# Patient Record
Sex: MALE | Race: WHITE | Employment: UNEMPLOYED | URBAN - METROPOLITAN AREA
[De-identification: names, ages, dates, MRNs, and addresses within clinical notes are randomized per-mention and may not be internally consistent; named-entity substitution may affect disease eponyms.]

---

## 2018-08-31 ENCOUNTER — APPOINTMENT (OUTPATIENT)
Dept: CT IMAGING | Age: 33
End: 2018-08-31
Payer: MEDICAID

## 2018-08-31 ENCOUNTER — HOSPITAL ENCOUNTER (EMERGENCY)
Age: 33
Discharge: HOME | End: 2018-08-31
Attending: EMERGENCY MEDICINE
Payer: MEDICAID

## 2018-08-31 ENCOUNTER — HOSPITAL ENCOUNTER (INPATIENT)
Age: 33
LOS: 2 days | Discharge: HOME | DRG: 047 | End: 2018-09-02
Attending: INTERNAL MEDICINE | Admitting: INTERNAL MEDICINE
Payer: MEDICAID

## 2018-08-31 VITALS
TEMPERATURE: 97.8 F | DIASTOLIC BLOOD PRESSURE: 87 MMHG | HEART RATE: 80 BPM | RESPIRATION RATE: 20 BRPM | HEIGHT: 67 IN | SYSTOLIC BLOOD PRESSURE: 122 MMHG | OXYGEN SATURATION: 97 % | BODY MASS INDEX: 23.54 KG/M2 | WEIGHT: 150 LBS

## 2018-08-31 DIAGNOSIS — R20.0 LEFT ARM NUMBNESS: ICD-10-CM

## 2018-08-31 DIAGNOSIS — R29.898 LEFT ARM WEAKNESS: Primary | ICD-10-CM

## 2018-08-31 PROBLEM — R20.2 NUMBNESS AND TINGLING OF BOTH FEET: Status: ACTIVE | Noted: 2018-08-31

## 2018-08-31 PROBLEM — G45.9 TIA (TRANSIENT ISCHEMIC ATTACK): Status: ACTIVE | Noted: 2018-08-31

## 2018-08-31 PROBLEM — F19.10 POLYSUBSTANCE ABUSE (HCC): Status: ACTIVE | Noted: 2018-08-31

## 2018-08-31 PROBLEM — F17.200 SMOKER: Status: ACTIVE | Noted: 2018-08-31

## 2018-08-31 LAB
A/G RATIO: 1.7 (ref 1.1–2.2)
ALBUMIN SERPL-MCNC: 4.5 G/DL (ref 3.4–5)
ALP BLD-CCNC: 83 U/L (ref 40–129)
ALT SERPL-CCNC: 30 U/L (ref 10–40)
AMPHETAMINE SCREEN, URINE: ABNORMAL
ANION GAP SERPL CALCULATED.3IONS-SCNC: 11 MMOL/L (ref 3–16)
AST SERPL-CCNC: 54 U/L (ref 15–37)
BARBITURATE SCREEN URINE: ABNORMAL
BASOPHILS ABSOLUTE: 0.1 K/UL (ref 0–0.2)
BASOPHILS RELATIVE PERCENT: 0.3 %
BENZODIAZEPINE SCREEN, URINE: ABNORMAL
BILIRUB SERPL-MCNC: <0.2 MG/DL (ref 0–1)
BILIRUBIN URINE: NEGATIVE
BLOOD, URINE: ABNORMAL
BUN BLDV-MCNC: 21 MG/DL (ref 7–20)
CALCIUM SERPL-MCNC: 9.3 MG/DL (ref 8.3–10.6)
CANNABINOID SCREEN URINE: POSITIVE
CHLORIDE BLD-SCNC: 100 MMOL/L (ref 99–110)
CLARITY: CLEAR
CO2: 27 MMOL/L (ref 21–32)
COCAINE METABOLITE SCREEN URINE: ABNORMAL
COLOR: YELLOW
CREAT SERPL-MCNC: 0.8 MG/DL (ref 0.9–1.3)
EOSINOPHILS ABSOLUTE: 0 K/UL (ref 0–0.6)
EOSINOPHILS RELATIVE PERCENT: 0 %
EPITHELIAL CELLS, UA: ABNORMAL /HPF
GFR AFRICAN AMERICAN: >60
GFR NON-AFRICAN AMERICAN: >60
GLOBULIN: 2.6 G/DL
GLUCOSE BLD-MCNC: 103 MG/DL (ref 70–99)
GLUCOSE URINE: NEGATIVE MG/DL
HCT VFR BLD CALC: 41.7 % (ref 40.5–52.5)
HCT VFR BLD CALC: 43.8 % (ref 40.5–52.5)
HEMOGLOBIN: 14 G/DL (ref 13.5–17.5)
HEMOGLOBIN: 14.5 G/DL (ref 13.5–17.5)
KETONES, URINE: NEGATIVE MG/DL
LEUKOCYTE ESTERASE, URINE: NEGATIVE
LYMPHOCYTES ABSOLUTE: 1.3 K/UL (ref 1–5.1)
LYMPHOCYTES RELATIVE PERCENT: 6.6 %
Lab: ABNORMAL
MCH RBC QN AUTO: 26.5 PG (ref 26–34)
MCH RBC QN AUTO: 26.6 PG (ref 26–34)
MCHC RBC AUTO-ENTMCNC: 33 G/DL (ref 31–36)
MCHC RBC AUTO-ENTMCNC: 33.5 G/DL (ref 31–36)
MCV RBC AUTO: 79.2 FL (ref 80–100)
MCV RBC AUTO: 80.6 FL (ref 80–100)
METHADONE SCREEN, URINE: ABNORMAL
MICROSCOPIC EXAMINATION: YES
MONOCYTES ABSOLUTE: 1.3 K/UL (ref 0–1.3)
MONOCYTES RELATIVE PERCENT: 6.6 %
MUCUS: ABNORMAL /LPF
NEUTROPHILS ABSOLUTE: 17.2 K/UL (ref 1.7–7.7)
NEUTROPHILS RELATIVE PERCENT: 86.5 %
NITRITE, URINE: NEGATIVE
OPIATE SCREEN URINE: POSITIVE
OXYCODONE URINE: ABNORMAL
PDW BLD-RTO: 15.2 % (ref 12.4–15.4)
PDW BLD-RTO: 15.3 % (ref 12.4–15.4)
PH UA: 6
PH UA: 6
PHENCYCLIDINE SCREEN URINE: ABNORMAL
PLATELET # BLD: 245 K/UL (ref 135–450)
PLATELET # BLD: 254 K/UL (ref 135–450)
PMV BLD AUTO: 7.7 FL (ref 5–10.5)
PMV BLD AUTO: 7.7 FL (ref 5–10.5)
POTASSIUM SERPL-SCNC: 4.5 MMOL/L (ref 3.5–5.1)
PROPOXYPHENE SCREEN: ABNORMAL
PROTEIN UA: ABNORMAL MG/DL
RBC # BLD: 5.27 M/UL (ref 4.2–5.9)
RBC # BLD: 5.43 M/UL (ref 4.2–5.9)
RBC UA: ABNORMAL /HPF (ref 0–2)
SODIUM BLD-SCNC: 138 MMOL/L (ref 136–145)
SPECIFIC GRAVITY UA: >=1.03
TOTAL PROTEIN: 7.1 G/DL (ref 6.4–8.2)
URINE REFLEX TO CULTURE: ABNORMAL
URINE TYPE: ABNORMAL
UROBILINOGEN, URINE: 0.2 E.U./DL
WBC # BLD: 15.6 K/UL (ref 4–11)
WBC # BLD: 19.8 K/UL (ref 4–11)
WBC UA: ABNORMAL /HPF (ref 0–5)

## 2018-08-31 PROCEDURE — 80307 DRUG TEST PRSMV CHEM ANLYZR: CPT

## 2018-08-31 PROCEDURE — 80053 COMPREHEN METABOLIC PANEL: CPT

## 2018-08-31 PROCEDURE — 85025 COMPLETE CBC W/AUTO DIFF WBC: CPT

## 2018-08-31 PROCEDURE — 99285 EMERGENCY DEPT VISIT HI MDM: CPT

## 2018-08-31 PROCEDURE — 81001 URINALYSIS AUTO W/SCOPE: CPT

## 2018-08-31 PROCEDURE — 70450 CT HEAD/BRAIN W/O DYE: CPT

## 2018-08-31 PROCEDURE — 93005 ELECTROCARDIOGRAM TRACING: CPT | Performed by: EMERGENCY MEDICINE

## 2018-08-31 PROCEDURE — 2580000003 HC RX 258: Performed by: INTERNAL MEDICINE

## 2018-08-31 PROCEDURE — 85027 COMPLETE CBC AUTOMATED: CPT

## 2018-08-31 PROCEDURE — 2580000003 HC RX 258: Performed by: PHYSICIAN ASSISTANT

## 2018-08-31 PROCEDURE — 6370000000 HC RX 637 (ALT 250 FOR IP): Performed by: INTERNAL MEDICINE

## 2018-08-31 PROCEDURE — 96360 HYDRATION IV INFUSION INIT: CPT

## 2018-08-31 PROCEDURE — 93010 ELECTROCARDIOGRAM REPORT: CPT | Performed by: INTERNAL MEDICINE

## 2018-08-31 PROCEDURE — 6370000000 HC RX 637 (ALT 250 FOR IP): Performed by: PHYSICIAN ASSISTANT

## 2018-08-31 PROCEDURE — 70498 CT ANGIOGRAPHY NECK: CPT

## 2018-08-31 PROCEDURE — 6360000004 HC RX CONTRAST MEDICATION: Performed by: EMERGENCY MEDICINE

## 2018-08-31 PROCEDURE — 70496 CT ANGIOGRAPHY HEAD: CPT

## 2018-08-31 PROCEDURE — 1200000000 HC SEMI PRIVATE

## 2018-08-31 PROCEDURE — 36415 COLL VENOUS BLD VENIPUNCTURE: CPT

## 2018-08-31 RX ORDER — ONDANSETRON 2 MG/ML
4 INJECTION INTRAMUSCULAR; INTRAVENOUS EVERY 6 HOURS PRN
Status: DISCONTINUED | OUTPATIENT
Start: 2018-08-31 | End: 2018-09-02 | Stop reason: HOSPADM

## 2018-08-31 RX ORDER — NICOTINE 21 MG/24HR
1 PATCH, TRANSDERMAL 24 HOURS TRANSDERMAL DAILY
Status: DISCONTINUED | OUTPATIENT
Start: 2018-09-01 | End: 2018-09-02 | Stop reason: HOSPADM

## 2018-08-31 RX ORDER — NICOTINE 21 MG/24HR
1 PATCH, TRANSDERMAL 24 HOURS TRANSDERMAL DAILY
Status: DISCONTINUED | OUTPATIENT
Start: 2018-08-31 | End: 2018-08-31 | Stop reason: HOSPADM

## 2018-08-31 RX ORDER — HYDROXYZINE PAMOATE 25 MG/1
50 CAPSULE ORAL EVERY 8 HOURS PRN
Status: DISCONTINUED | OUTPATIENT
Start: 2018-08-31 | End: 2018-09-02 | Stop reason: HOSPADM

## 2018-08-31 RX ORDER — DICYCLOMINE HCL 20 MG
20 TABLET ORAL EVERY 6 HOURS PRN
Status: DISCONTINUED | OUTPATIENT
Start: 2018-08-31 | End: 2018-09-02 | Stop reason: HOSPADM

## 2018-08-31 RX ORDER — CLONIDINE HYDROCHLORIDE 0.1 MG/1
0.1 TABLET ORAL PRN
Status: DISCONTINUED | OUTPATIENT
Start: 2018-08-31 | End: 2018-09-02 | Stop reason: HOSPADM

## 2018-08-31 RX ORDER — IBUPROFEN 400 MG/1
800 TABLET ORAL EVERY 8 HOURS PRN
Status: DISCONTINUED | OUTPATIENT
Start: 2018-08-31 | End: 2018-09-02 | Stop reason: HOSPADM

## 2018-08-31 RX ORDER — UREA 10 %
2 LOTION (ML) TOPICAL NIGHTLY
Status: DISCONTINUED | OUTPATIENT
Start: 2018-08-31 | End: 2018-09-02 | Stop reason: HOSPADM

## 2018-08-31 RX ORDER — PROMETHAZINE HYDROCHLORIDE 25 MG/1
25 TABLET ORAL EVERY 6 HOURS PRN
Status: DISCONTINUED | OUTPATIENT
Start: 2018-08-31 | End: 2018-09-02 | Stop reason: HOSPADM

## 2018-08-31 RX ORDER — SODIUM CHLORIDE 0.9 % (FLUSH) 0.9 %
10 SYRINGE (ML) INJECTION EVERY 12 HOURS SCHEDULED
Status: DISCONTINUED | OUTPATIENT
Start: 2018-08-31 | End: 2018-09-02 | Stop reason: HOSPADM

## 2018-08-31 RX ORDER — GABAPENTIN 300 MG/1
300 CAPSULE ORAL EVERY 8 HOURS PRN
Status: DISCONTINUED | OUTPATIENT
Start: 2018-08-31 | End: 2018-09-02 | Stop reason: HOSPADM

## 2018-08-31 RX ORDER — 0.9 % SODIUM CHLORIDE 0.9 %
1000 INTRAVENOUS SOLUTION INTRAVENOUS ONCE
Status: COMPLETED | OUTPATIENT
Start: 2018-08-31 | End: 2018-08-31

## 2018-08-31 RX ORDER — TRAMADOL HYDROCHLORIDE 50 MG/1
50 TABLET ORAL PRN
Status: DISCONTINUED | OUTPATIENT
Start: 2018-08-31 | End: 2018-09-02 | Stop reason: HOSPADM

## 2018-08-31 RX ORDER — SODIUM CHLORIDE 0.9 % (FLUSH) 0.9 %
10 SYRINGE (ML) INJECTION PRN
Status: DISCONTINUED | OUTPATIENT
Start: 2018-08-31 | End: 2018-09-02 | Stop reason: HOSPADM

## 2018-08-31 RX ORDER — ASPIRIN 81 MG/1
81 TABLET ORAL DAILY
Status: DISCONTINUED | OUTPATIENT
Start: 2018-08-31 | End: 2018-09-02 | Stop reason: HOSPADM

## 2018-08-31 RX ADMIN — Medication 2 MG: at 19:38

## 2018-08-31 RX ADMIN — HYDROXYZINE PAMOATE 50 MG: 25 CAPSULE ORAL at 19:39

## 2018-08-31 RX ADMIN — SODIUM CHLORIDE, PRESERVATIVE FREE 10 ML: 5 INJECTION INTRAVENOUS at 19:39

## 2018-08-31 RX ADMIN — TRAMADOL HYDROCHLORIDE 50 MG: 50 TABLET, FILM COATED ORAL at 19:38

## 2018-08-31 RX ADMIN — IOPAMIDOL 85 ML: 755 INJECTION, SOLUTION INTRAVENOUS at 09:32

## 2018-08-31 RX ADMIN — CLONIDINE HYDROCHLORIDE 0.1 MG: 0.1 TABLET ORAL at 19:38

## 2018-08-31 RX ADMIN — SODIUM CHLORIDE 1000 ML: 9 INJECTION, SOLUTION INTRAVENOUS at 10:55

## 2018-08-31 ASSESSMENT — PAIN DESCRIPTION - PAIN TYPE
TYPE: ACUTE PAIN
TYPE: ACUTE PAIN

## 2018-08-31 ASSESSMENT — PAIN SCALES - GENERAL
PAINLEVEL_OUTOF10: 4
PAINLEVEL_OUTOF10: 9

## 2018-08-31 ASSESSMENT — PAIN DESCRIPTION - ORIENTATION
ORIENTATION: LEFT
ORIENTATION: LEFT

## 2018-08-31 ASSESSMENT — PAIN DESCRIPTION - LOCATION: LOCATION: SHOULDER

## 2018-08-31 ASSESSMENT — PAIN DESCRIPTION - DESCRIPTORS: DESCRIPTORS: BURNING

## 2018-08-31 NOTE — ED NOTES
As of 1419 No call back from Neuro at [facility] for consult sent out.   LUKE Keating was made aware,      [facility]  08/31/18 2556

## 2018-08-31 NOTE — ED NOTES
2530- Call returned [facility]  and spoke to [facility].       Ysabel Fang  08/31/18 0897

## 2018-08-31 NOTE — ED PROVIDER NOTES
[address] ED  eMERGENCY dEPARTMENT eNCOUnter        Pt Name: Tracy Kaur  MRN: [id number]  [place] 1985  Date of evaluation: 8/31/2018  Provider: Angelo Hayes PA-C  PCP: No primary care provider on file. ED Attending: No att. providers found    [address] [facility]       Chief Complaint   Patient presents with    Other     pt states he noticed a rash on his left shoulder @ 7a and then noticed tingling and numbness into his left arm \"like when your arm falls asleep. \". Pt is able to move and bend his left arm however he states \"feels numb\". Pt also states right foot feels the same since 0730. HISTORY OF PRESENT ILLNESS   (Location/Symptom, Timing/Onset, Context/Setting, Quality, Duration, Modifying Factors, Severity)  Note limiting factors. Tracy Kaur is a 27 y.o. male presents to the emergency department with a rash on his left shoulder and numbness and tingling of the left arm. The patient states that his symptoms started around 7 when he noticed his rash on the left shoulder and then numbness and tingling in his left arm. He denies any family history of stroke, personal history of hypertension, high cholesterol, recreational drugs, headache, visual change, chest pain, shortness of breath, abdominal pain, abnormal gait. He also denies change in medications, foods, new lotions, deodorants or soaps. Nursing Notes were all reviewed and agreed with or any disagreements were addressed  in the HPI. REVIEW OF SYSTEMS    (2-9 systems for level 4, 10 or more for level 5)     Review of Systems    Positives and Pertinent negatives as per HPI. Except as noted above in the ROS, all other systems were reviewed and negative. PAST MEDICAL HISTORY   History reviewed. No pertinent past medical history. SURGICAL HISTORY     History reviewed. No pertinent surgical history.       CURRENT MEDICATIONS       Previous Medications    NAPROXEN (NAPROSYN) 375 MG TABLET    Take 1 tablet by mouth 2 times daily for 20 doses As needed for hip pain         ALLERGIES     Patient has no known allergies. FAMILY HISTORY     History reviewed. No pertinent family history. SOCIAL HISTORY       Social History     Social History    Marital status: Single     Spouse name: N/A    Number of children: N/A    Years of education: N/A     Social History Main Topics    Smoking status: Current Every Day Smoker     Packs/day: 0.33     Types: Cigarettes    Smokeless tobacco: Never Used    Alcohol use 0.6 - 1.2 oz/week     1 - 2 Cans of beer per week      Comment: couple times a month    Drug use: Yes     Types: Marijuana    Sexual activity: Not Asked     Other Topics Concern    None     Social History Narrative    None       SCREENINGS   NIH Stroke Scale  Interval: Baseline  Level of Consciousness (1a. ): Alert  LOC Questions (1b. ): Answers both correctly  LOC Commands (1c. ): Obeys both correctly  Best Gaze (2. ): Normal  Visual (3. ): No visual loss  Facial Palsy (4. ): Normal  Motor Arm, Left (5a. ): No drift  Motor Arm, Right (5b. ): No drift  Motor Leg, Left (6a. ): No drift  Motor Leg, Right (6b. ): No drift  Limb Ataxia (7. ): Absent  Sensory (8. ): (!) Partial loss  Best Language (9. ): No aphasia  Dysarthria (10. ): Normal  Extinction and Inattention (11): No neglect  Total: 1Glasgow Coma Scale  Eye Opening: Spontaneous  Best Verbal Response: Oriented  Best Motor Response: Obeys commands  Yony Coma Scale Score: 15        PHYSICAL EXAM    (up to 7 for level 4, 8 or more for level 5)     ED Triage Vitals [08/31/18 0819]   BP Temp Temp Source Pulse Resp SpO2 Height Weight   (!) 126/95 97.8 °F (36.6 °C) Oral 90 16 98 % 5' 7\" (1.702 m) 150 lb (68 kg)       Physical Exam   Constitutional: He is oriented to person, place, and time. He appears well-developed and well-nourished. HENT:   Head: Normocephalic and atraumatic.    Right Ear: External ear normal.   Left Ear: External ear normal.   Nose: Nose normal.   Eyes: Pupils are equal, round, and reactive to light. Conjunctivae and EOM are normal. Right eye exhibits no discharge. Left eye exhibits no discharge. No scleral icterus. Neck: Normal range of motion. Neck supple. Cardiovascular: Normal rate, regular rhythm, normal heart sounds and intact distal pulses. Exam reveals no gallop and no friction rub. No murmur heard. Pulmonary/Chest: Effort normal and breath sounds normal. No respiratory distress. He has no wheezes. He has no rales. Neurological: He is alert and oriented to person, place, and time. He has normal reflexes. He displays no atrophy, no tremor and normal reflexes. A sensory deficit (Numbness from left mid humerus down to the fingers) is present. No cranial nerve deficit. He exhibits normal muscle tone. He displays no seizure activity. Coordination and gait normal.   Reflex Scores:       Tricep reflexes are 2+ on the right side and 2+ on the left side. Bicep reflexes are 2+ on the right side and 2+ on the left side. Brachioradialis reflexes are 2+ on the right side and 2+ on the left side. Patellar reflexes are 2+ on the right side and 2+ on the left side. Achilles reflexes are 2+ on the right side and 2+ on the left side. Finger-to-nose normal on the right side, but patient stated that he could not bend at his elbow on the left side. No pronator drift. Strength is 5 out of 5 in the lower extremities as well as the right side, but is 0 out of 5 in his hand and 3 out of 5 in his left upper extremity. On initial examination during history the patient was able to bend at his elbow and move his arm without difficulty other than a limp wrist.   Skin: Skin is warm and dry. He is not diaphoretic. No pallor. Rosacea noted on the patient's face. 3 cm erythematous macule that is nontender, draining, bleeding or evidence of crepitus.   With opening ear erythematous streak in the patient's axillary fold. Psychiatric: He has a normal mood and affect. His behavior is normal. Thought content normal.   Nursing note and vitals reviewed. DIAGNOSTIC RESULTS   LABS:    Labs Reviewed   CBC WITH AUTO DIFFERENTIAL   COMPREHENSIVE METABOLIC PANEL   URINE RT REFLEX TO CULTURE   URINE DRUG SCREEN       All other labs were within normal range or not returned as of this dictation. EKG: All EKG's are interpreted by the Emergency Department Physician who either signs or Co-signs this chart in the absence of a cardiologist.  Please see their note for interpretation of EKG. RADIOLOGY:   Non-plain film images such as CT, Ultrasound and MRI are read by the radiologist. Plain radiographic images are visualized and preliminarily interpreted by the  ED Provider with the below findings:        Interpretation per the Radiologist below, if available at the time of this note:    No orders to display     No results found. PROCEDURES   Unless otherwise noted below, none     Procedures    CRITICAL CARE TIME   N/A    CONSULTS:  None      EMERGENCY DEPARTMENT COURSE and DIFFERENTIAL DIAGNOSIS/MDM:   Vitals:    Vitals:    08/31/18 0819   BP: (!) 126/95   Pulse: 90   Resp: 16   Temp: 97.8 °F (36.6 °C)   TempSrc: Oral   SpO2: 98%   Weight: 150 lb (68 kg)   Height: 5' 7\" (1.702 m)       Patient was given the following medications:  Medications - No data to display    Patient presents to the emergency department with left arm numbness and tingling as well as a rash. It appears to be dermatitis on his left shoulder and some rosacea on his face. On initial examination the patient was moving his arm without difficulty, but had a limp wrist.  When I was doing the neurologic examination he stated that he was unable to bend at his elbow and unable to lift his left arm. When I asked him to get out of bed and walked he lifted up his left arm at that time.   On history the patient denied any recreational drugs, but told the nurse that he used marijuana last night. Urinalysis shows small amount of blood and trace protein. Urine drug screen does show evidence for marijuana and opiates. CBC does show leukocytosis with left shift. CMP shows BUN of 21 and creatinine 0.8. CT head without contrast showed no acute intracranial abnormality. CT of the head and neck is unremarkable. EKG shows normal sinus rhythm with incomplete right bundle-branch block. Ventricular rate of 81, MI interval 122 QRS duration of 94. My suspicion for cardiac etiology is extremely low. I feel that this would be a very uncommon presentation for a stroke, but is not completely excluded. Could be peripheral neuropathy, but would have to include more than one nerve distribution. My attending did speak with the stroke team who stated that he wouldn't be a candidate for TPA. He has been afebrile, appearing nontoxic and stable hemodynamically while here. I do feel that the patient needs to be admitted for observation and MRI as recommended by the stroke team.  I did consult with [facility] who agreed to accept the patient for observation and MRI. The patient was transferred in stable condition. The patient tolerated their visit well. They were seen and evaluated by the attending physician who agreed with the assessment and plan. The patient and / or the family were informed of the results of any tests, a time was given to answer questions, a plan was proposed and they agreed with plan. FINAL IMPRESSION      1. Left arm weakness    2. Left arm numbness          DISPOSITION/PLAN   DISPOSITION    Decision to transfer    PATIENT REFERRED TO:  No follow-up provider specified.     DISCHARGE MEDICATIONS:  New Prescriptions    No medications on file       DISCONTINUED MEDICATIONS:  Discontinued Medications    No medications on file              (Please note that portions of this note were completed with a voice recognition program.

## 2018-08-31 NOTE — ED PROVIDER NOTES
I independently performed a history and physical on Liana Reza. All diagnostic, treatment, and disposition decisions were made by myself in conjunction with the advanced practice provider. For further details of Davon Vasquez emergency department encounter, please see LUKE Soriano's documentation. Patient presents to the emergency room complaining of left arm numbness and weakness. He states that when he went to bed last night at approximately midnight that symptoms were not present. He woke at 3 AM and noted symptoms. Patient reports that he has full strength of his left shoulder, however, is able to flex or extend his left elbow, or wrist.  He also reports some numbness and tingling from approximately the mid upper arm and into the fingertips. Physical exam: Gen.: No acute distress. Heart: Regular. Normal S1-S2. Lungs: Clear to auscultation bilaterally. Neurologic: No speech deficits. Cranial nerves II through XII intact. Normal strength and sensation of right upper and bilateral lower extremities. Patient also has 5/5 strength in left shoulder. However, 0/5 strength is noted of the biceps/triceps, and left forearm. Decreased sensation from left upper arm extending to the fingertips. Distal pulses and sensation intact. Consult was placed to stroke team.  He was initially felt that this patient's symptoms are most likely related to atypical peripheral neuropathy, however, central process cannot be excluded on exam.  CT/CTA imaging as recommended. This did not reveal any large vessel occlusions and it was not felt to benefit patient with TPA at this time when discussed with neurology. Assessment/plan:   1. Left arm weakness    2.  Left arm numbness               Seth Rodriguez, DO  08/31/18 [facility], DO  08/31/18 8745

## 2018-08-31 NOTE — ED NOTES
First care @ [facility]. [facility].       [facility]  08/31/18 3363

## 2018-09-01 ENCOUNTER — APPOINTMENT (OUTPATIENT)
Dept: MRI IMAGING | Age: 33
DRG: 047 | End: 2018-09-01
Attending: INTERNAL MEDICINE
Payer: MEDICAID

## 2018-09-01 LAB
CHOLESTEROL, TOTAL: 102 MG/DL (ref 0–199)
HDLC SERPL-MCNC: 40 MG/DL (ref 40–60)
LDL CHOLESTEROL CALCULATED: 49 MG/DL
TRIGL SERPL-MCNC: 63 MG/DL (ref 0–150)
VLDLC SERPL CALC-MCNC: 13 MG/DL

## 2018-09-01 PROCEDURE — 70551 MRI BRAIN STEM W/O DYE: CPT

## 2018-09-01 PROCEDURE — 99255 IP/OBS CONSLTJ NEW/EST HI 80: CPT | Performed by: PSYCHIATRY & NEUROLOGY

## 2018-09-01 PROCEDURE — 2580000003 HC RX 258: Performed by: INTERNAL MEDICINE

## 2018-09-01 PROCEDURE — 80061 LIPID PANEL: CPT

## 2018-09-01 PROCEDURE — 36415 COLL VENOUS BLD VENIPUNCTURE: CPT

## 2018-09-01 PROCEDURE — 6360000002 HC RX W HCPCS: Performed by: INTERNAL MEDICINE

## 2018-09-01 PROCEDURE — 6370000000 HC RX 637 (ALT 250 FOR IP): Performed by: INTERNAL MEDICINE

## 2018-09-01 PROCEDURE — 1200000000 HC SEMI PRIVATE

## 2018-09-01 RX ADMIN — Medication 2 MG: at 20:41

## 2018-09-01 RX ADMIN — TRAMADOL HYDROCHLORIDE 50 MG: 50 TABLET, FILM COATED ORAL at 00:07

## 2018-09-01 RX ADMIN — CLONIDINE HYDROCHLORIDE 0.1 MG: 0.1 TABLET ORAL at 00:07

## 2018-09-01 RX ADMIN — HYDROXYZINE PAMOATE 50 MG: 25 CAPSULE ORAL at 23:01

## 2018-09-01 RX ADMIN — PROMETHAZINE HYDROCHLORIDE 25 MG: 25 TABLET ORAL at 23:01

## 2018-09-01 RX ADMIN — TRAMADOL HYDROCHLORIDE 50 MG: 50 TABLET, FILM COATED ORAL at 20:41

## 2018-09-01 RX ADMIN — ASPIRIN 81 MG: 81 TABLET ORAL at 10:50

## 2018-09-01 RX ADMIN — SODIUM CHLORIDE, PRESERVATIVE FREE 10 ML: 5 INJECTION INTRAVENOUS at 10:50

## 2018-09-01 RX ADMIN — SODIUM CHLORIDE, PRESERVATIVE FREE 10 ML: 5 INJECTION INTRAVENOUS at 20:42

## 2018-09-01 RX ADMIN — CLONIDINE HYDROCHLORIDE 0.1 MG: 0.1 TABLET ORAL at 20:42

## 2018-09-01 ASSESSMENT — PAIN SCALES - GENERAL
PAINLEVEL_OUTOF10: 8
PAINLEVEL_OUTOF10: 0
PAINLEVEL_OUTOF10: 0
PAINLEVEL_OUTOF10: 9
PAINLEVEL_OUTOF10: 8
PAINLEVEL_OUTOF10: 9

## 2018-09-01 NOTE — CONSULTS
In patient Neurology consult        [facility]      MD Quoc Sousa Kartikcorinne  1985    Date of Service: 9/1/2018    Referring Physician: Ruben Hernandez MD      Reason for the consult: left sided weakness    HPI:   The patient is a 27y.o.  years old male who was admitted to [facility] from [facility] yesterday with acute left sided weakness and numbness. Symptoms started yesterday AM. He woke up with left UE numbness. He then felt weaker on his left side. No headache or double vision or blurred vision. No dysarthria or chest pain. Degree was severe and duration was consistent. He was evaluated in the ED. CT head showed no acute findings. CTA head and neck showed no severe stenosis. He was transferred to [place]. The patient's headache continues to feel weaker on his left side mainly his left arm. Occasional numbness in his left hand. No recent fever or chills or neck pain. No trauma or injury of bladder or bowel issues. No prior history of seizures or strokes or family history of DVT or PE. He is scheduled for MRI. Other review of system was unremarkable. No past medical history on file. No family history on file. No past surgical history on file. No past surgical history on file. No family history on file.   Social History   Substance Use Topics    Smoking status: Current Every Day Smoker     Packs/day: 0.33     Types: Cigarettes    Smokeless tobacco: Never Used    Alcohol use 0.6 - 1.2 oz/week     1 - 2 Cans of beer per week      Comment: couple times a month     No Known Allergies  Current Facility-Administered Medications   Medication Dose Route Frequency Provider Last Rate Last Dose    nicotine (NICODERM CQ) 14 MG/24HR 1 patch  1 patch Transdermal Daily Ruben Hernandez MD        sodium chloride flush 0.9 % injection 10 mL  10 mL Intravenous 2 times per day Ruben Hernandez MD   10 mL at 08/31/18 1939    sodium chloride flush 0.9 % injection 10 mL  10 mL Intravenous PRN Fausto Damian MD        magnesium hydroxide (MILK OF MAGNESIA) 400 MG/5ML suspension 30 mL  30 mL Oral Daily PRN Fausto Damian MD        ondansetron [facility]) injection 4 mg  4 mg Intravenous Q6H PRN Fausto Damian MD        enoxaparin (LOVENOX) injection 40 mg  40 mg Subcutaneous Daily Fausto Damian MD        aspirin EC tablet 81 mg  81 mg Oral Daily Fausto Damian MD        traMADol Ignacio Brigette) tablet 50 mg  50 mg Oral PRN Fausto Damian MD   50 mg at 09/01/18 0007    And    cloNIDine (CATAPRES) tablet 0.1 mg  0.1 mg Oral PRN Fausto Damian MD   0.1 mg at 09/01/18 0007    dicyclomine (BENTYL) tablet 20 mg  20 mg Oral Q6H PRN Fausto Damian MD        hydrOXYzine (VISTARIL) capsule 50 mg  50 mg Oral Q8H PRN Fausto Damian MD   50 mg at 08/31/18 1939    promethazine (PHENERGAN) tablet 25 mg  25 mg Oral Q6H PRN Fausto Damian MD        gabapentin (NEURONTIN) capsule 300 mg  300 mg Oral Q8H PRN Fausto Damian MD        melatonin tablet 2 mg  2 mg Oral Nightly Fausto Damian MD   2 mg at 08/31/18 1938    ibuprofen (ADVIL;MOTRIN) tablet 800 mg  800 mg Oral Q8H PRN Fausto Damian MD           ROS : A 10-12 system review of constitutional, cardiovascular, respiratory, musculoskeletal, endocrine, skin, hematological, SHEENT, genitourinary, psychiatric and neurologic systems was obtained and updated today and is unremarkable except as mentioned in my HPI      Exam:   Constitutional:   Vitals:    08/31/18 1746 08/31/18 1930 09/01/18 0002 09/01/18 0356   BP: (!) 134/94 135/80 (!) 140/92 132/81   Pulse: 77 84 83 81   Resp: 20 16 14 14   Temp: 98.1 °F (36.7 °C) 98.7 °F (37.1 °C) 98.4 °F (36.9 °C) 98.5 °F (36.9 °C)   TempSrc: Oral Oral Oral Oral   SpO2: 99% 100% 100% 99%   Weight:   150 lb (68 kg)    Height:   5' 7\" (1.702 m)        General appearance: NAD  Eye: No icterus. No blurring of optic disc. Neck: supple  Cardiovascular: No carotid bruit.          No

## 2018-09-01 NOTE — PROGRESS NOTES
MRI checklist completed and faxed to MRI department. Pt's questions were answered and pt denies any further needs at this time. Pt stated he will need some medication for the MRI as he is slightly clausterphobic. Will continue to monitor.

## 2018-09-01 NOTE — PROGRESS NOTES
May pt go off of tele monitor for MRI? Just need a nursing communication. Thank you!  Sent to Dr. Dany Love

## 2018-09-01 NOTE — PROGRESS NOTES
Hospitalist Progress Note      PCP: No primary care provider on file. Date of Admission: 8/31/2018    Chief Complaint: Weakness and numbness  Left arm since 3 am 8/31/18     Hospital Course:   27 y.o. male who presented to [address] [place] [address] with above complaint and transferred over here for further management. He woke up with numbness and unable to move left upper extremity around 3 AM.  The symptoms did not improve by morning. He went to [facility] ER around 8 AM.  The workup did not show any obvious stroke. He was sent over here for further workup and neurology evaluation. Still his left arm is normal and he told he does not feel anything heavy and he couldn't control left arm with so clumsy. No color changes in the arm no swelling arm. He does not have any change in mental status, numbness or weakness over the other extremities. Except numbness of the tip of the toes of both lower extremities. No nausea vomiting diarrhea or urinary complaints. He denies cough trouble breathing. He smokes and's note marijuana and opioids. Lost opioid use was 2 days ago. He denies seizures or fever. He denies any injury to left arm or sleeping inawkward position last night    Subjective: Able to move that shoulder. Unable to move at the elbow. The numbness improved and able to feel up to elbow - left - Unable to feel from just  elbow from downwards. No change in mental status or any other focal neurological symptoms. No  Change in mental status.   He is having breakfast      Medications:  Reviewed    Infusion Medications   Scheduled Medications    nicotine  1 patch Transdermal Daily    sodium chloride flush  10 mL Intravenous 2 times per day    enoxaparin  40 mg Subcutaneous Daily    aspirin  81 mg Oral Daily    melatonin  2 mg Oral Nightly     PRN Meds: sodium chloride flush, magnesium hydroxide, ondansetron, traMADol **AND** cloNIDine, dicyclomine, hydrOXYzine, promethazine, gabapentin, ibuprofen    No intake or output data in the 24 hours ending 09/01/18 0753    Physical Exam Performed:    /81   Pulse 81   Temp 98.5 °F (36.9 °C) (Oral)   Resp 14   Ht 5' 7\" (1.702 m)   Wt 150 lb (68 kg)   SpO2 99%   BMI 23.49 kg/m²     General appearance: No apparent distress, appears stated age and cooperative. HEENT: Pupils equal, round, and reactive to light. Conjunctivae/corneas clear. Neck: Supple, with full range of motion. No jugular venous distention. Trachea midline. Respiratory:  Normal respiratory effort. Clear to auscultation, bilaterally without Rales/Wheezes/Rhonchi. Cardiovascular: Regular rate and rhythm with normal S1/S2 without murmurs, rubs or gallops. Abdomen: Soft, non-tender, non-distended with normal bowel sounds. Musculoskeletal: No clubbing, cyanosis or edema bilaterally. Skin: Skin color, texture, turgor normal.  Abrasion over the left shoulder and in the armpit is improving no open area  Neurologic:  Left arm weakness and numbness. He is unable to move and left elbow down. And unable to feel from left elbow down. This improved compared to yesterday. . Cranial nerves: II-XII intact, grossly non-focal.  Psychiatric: Alert and oriented, thought content appropriate, normal insight  Capillary Refill: Brisk,< 3 seconds   Peripheral Pulses: +2 palpable, equal bilaterally       Labs:   Recent Labs      08/31/18   0855  08/31/18   1934   WBC  19.8*  15.6*   HGB  14.5  14.0   HCT  43.8  41.7   PLT  254  245     Recent Labs      08/31/18   0855   NA  138   K  4.5   CL  100   CO2  27   BUN  21*   CREATININE  0.8*   CALCIUM  9.3     Recent Labs      08/31/18   0855   AST  54*   ALT  30   BILITOT  <0.2   ALKPHOS  83     No results for input(s): INR in the last 72 hours. No results for input(s): Edward Imus in the last 72 hours.     Urinalysis:      Lab Results   Component Value Date    NITRU Negative 08/31/2018    WBCUA 3-5 08/31/2018    RBCUA 3-5 08/31/2018    BLOODU SMALL 08/31/2018    SPECGRAV >=1.030 08/31/2018    GLUCOSEU Negative 08/31/2018       Radiology:  MRI brain without contrast    (Results Pending)           Assessment/Plan:    Active Hospital Problems    Diagnosis Date Noted    TIA (transient ischemic attack) [G45.9] 08/31/2018    Weakness of left arm [R29.898] 08/31/2018    Polysubstance abuse [F19.10] 08/31/2018    Smoker [F17.200] 08/31/2018    Numbness and tingling of both feet [R20.0, R20.2] 08/31/2018     Left arm weakness and numbness-since 3 AM 8/31 -most probably local nerve injury rather than CVA-, given history of polysubstance abuse CVA need to be ruled out -admit, telemetry- no abnormalities, neurovascular check-symptoms improving,on aspirin, lipid profile, neurology evaluation, MRI of the brain, echo, left arm sling to prevent injury to the left arm     Bilateral feet numbness-most probably polyneuropathy rather than focal cause -improved     Polysubstance abuse-opioids, marijuana, benzodiazepine-watch for withdrawals-COWS protocol     Smoker-counseled    Leukocytosis-no evidence of infection, trending down    DVT Prophylaxis: Lovenox  Diet: DIET GENERAL;  Code Status: Full Code    PT/OT Eval Status: Ambulatory, not ordered yet    Dispo - pending neuro input    Jasmyne Chapman MD

## 2018-09-02 ENCOUNTER — APPOINTMENT (OUTPATIENT)
Dept: MRI IMAGING | Age: 33
DRG: 047 | End: 2018-09-02
Attending: INTERNAL MEDICINE
Payer: MEDICAID

## 2018-09-02 VITALS
SYSTOLIC BLOOD PRESSURE: 107 MMHG | HEIGHT: 67 IN | BODY MASS INDEX: 23.54 KG/M2 | RESPIRATION RATE: 16 BRPM | HEART RATE: 61 BPM | WEIGHT: 150 LBS | OXYGEN SATURATION: 95 % | DIASTOLIC BLOOD PRESSURE: 65 MMHG | TEMPERATURE: 98.1 F

## 2018-09-02 PROCEDURE — 6370000000 HC RX 637 (ALT 250 FOR IP): Performed by: INTERNAL MEDICINE

## 2018-09-02 PROCEDURE — A9579 GAD-BASE MR CONTRAST NOS,1ML: HCPCS | Performed by: INTERNAL MEDICINE

## 2018-09-02 PROCEDURE — 72156 MRI NECK SPINE W/O & W/DYE: CPT

## 2018-09-02 PROCEDURE — 6360000004 HC RX CONTRAST MEDICATION: Performed by: INTERNAL MEDICINE

## 2018-09-02 RX ADMIN — GADOTERIDOL 14 ML: 279.3 INJECTION, SOLUTION INTRAVENOUS at 09:20

## 2018-09-02 RX ADMIN — GABAPENTIN 300 MG: 300 CAPSULE ORAL at 06:15

## 2018-09-02 ASSESSMENT — PAIN SCALES - GENERAL
PAINLEVEL_OUTOF10: 0
PAINLEVEL_OUTOF10: 8
PAINLEVEL_OUTOF10: 0

## 2018-09-02 ASSESSMENT — PAIN DESCRIPTION - PAIN TYPE: TYPE: ACUTE PAIN

## 2018-09-02 ASSESSMENT — PAIN DESCRIPTION - ORIENTATION: ORIENTATION: RIGHT

## 2018-09-02 ASSESSMENT — PAIN DESCRIPTION - LOCATION: LOCATION: ARM;SHOULDER

## 2018-09-02 NOTE — PROGRESS NOTES
Hospitalist Progress Note      PCP: No primary care provider on file. Date of Admission: 8/31/2018    Chief Complaint: Weakness and numbness  Left arm since 3 am 8/31/18     Hospital Course:   27 y.o. male who presented to [address] [place] [address] with above complaint and transferred over here for further management. He woke up with numbness and unable to move left upper extremity around 3 AM.  The symptoms did not improve by morning. He went to [facility] [address] [facility] ER around 8 AM.  The workup did not show any obvious stroke. He was sent over here for further workup and neurology evaluation. Still his left arm is normal and he told he does not feel anything heavy and he couldn't control left arm with so clumsy. No color changes in the arm no swelling arm. He does not have any change in mental status, numbness or weakness over the other extremities. Except numbness of the tip of the toes of both lower extremities. No nausea vomiting diarrhea or urinary complaints. He denies cough trouble breathing. He smokes and's note marijuana and opioids. Lost opioid use was 2 days ago. He denies seizures or fever. He denies any injury to left arm or sleeping inawkward position last night    Subjective: Able to move that shoulder. Unable to move at the elbow. The numbness improved and able to feel up to elbow - left - Unable to feel from just  elbow from downwards. No change in mental status or any other focal neurological symptoms.   No change from yesterday    Medications:  Reviewed    Infusion Medications   Scheduled Medications    nicotine  1 patch Transdermal Daily    sodium chloride flush  10 mL Intravenous 2 times per day    enoxaparin  40 mg Subcutaneous Daily    aspirin  81 mg Oral Daily    melatonin  2 mg Oral Nightly     PRN Meds: sodium chloride flush, magnesium hydroxide, ondansetron, traMADol **AND** cloNIDine, dicyclomine, hydrOXYzine, promethazine, gabapentin, ibuprofen      Intake/Output Summary (Last 24 hours) at 09/02/18 0843  Last data filed at 09/01/18 1048   Gross per 24 hour   Intake              480 ml   Output                0 ml   Net              480 ml       Physical Exam Performed:    /65   Pulse 61   Temp 98.1 °F (36.7 °C) (Oral)   Resp 16   Ht 5' 7\" (1.702 m)   Wt 150 lb (68 kg)   SpO2 95%   BMI 23.49 kg/m²     General appearance: No apparent distress, appears stated age and cooperative. HEENT: Pupils equal, round, and reactive to light. Conjunctivae/corneas clear. Neck: Supple, with full range of motion. No jugular venous distention. Trachea midline. Respiratory:  Normal respiratory effort. Clear to auscultation, bilaterally without Rales/Wheezes/Rhonchi. Cardiovascular: Regular rate and rhythm with normal S1/S2 without murmurs, rubs or gallops. Abdomen: Soft, non-tender, non-distended with normal bowel sounds. Musculoskeletal: No clubbing, cyanosis or edema bilaterally. Skin: Skin color, texture, turgor normal.  Abrasion over the left shoulder and in the armpit is improving no open area  Neurologic:  Left arm weakness and numbness. He is unable to move and left elbow down. And unable to feel from left elbow down. This improved compared to yesterday. . Cranial nerves: II-XII intact, grossly non-focal.  Psychiatric: Alert and oriented, thought content appropriate, normal insight  Capillary Refill: Brisk,< 3 seconds   Peripheral Pulses: +2 palpable, equal bilaterally       Labs:   Recent Labs      08/31/18   0855  08/31/18   1934   WBC  19.8*  15.6*   HGB  14.5  14.0   HCT  43.8  41.7   PLT  254  245     Recent Labs      08/31/18   0855   NA  138   K  4.5   CL  100   CO2  27   BUN  21*   CREATININE  0.8*   CALCIUM  9.3     Recent Labs      08/31/18   0855   AST  54*   ALT  30   BILITOT  <0.2   ALKPHOS  83     No results for input(s): INR in the last 72 hours. No results for input(s): Mitcheal Buckle in the last 72 hours.     Urinalysis:      Lab Results   Component Value Date    NITRU Negative 08/31/2018    WBCUA 3-5 08/31/2018    RBCUA 3-5 08/31/2018    BLOODU SMALL 08/31/2018    SPECGRAV >=1.030 08/31/2018    GLUCOSEU Negative 08/31/2018       Radiology:  MRI brain without contrast   Final Result   No acute intracranial abnormality. Specifically, there is no acute infarct,   mass or hemorrhage.          MRI CERVICAL SPINE W WO CONTRAST    (Results Pending)           Assessment/Plan:    Active Hospital Problems    Diagnosis Date Noted    Dyslipidemia [E78.5]     ETOH abuse [F10.10]     TIA (transient ischemic attack) [G45.9] 08/31/2018    Weakness of left arm [R29.898] 08/31/2018    Polysubstance abuse [F19.10] 08/31/2018    Smoking [F17.200] 08/31/2018    Numbness and tingling of both feet [R20.0, R20.2] 08/31/2018     Left arm weakness and numbness-since 3 AM 8/31 -most probably local nerve injury rather than CVA-, given history of polysubstance abuse CVA need to be ruled out -admit, telemetry- no abnormalities, neurovascular check-symptoms improving,on aspirin, lipid profile, neurology evaluation, MRI of the brain-no evidence of stroke, echo, left arm sling to prevent injury to the left arm  Waiting on MRI of the C-spine     Bilateral feet numbness-most probably polyneuropathy rather than focal cause -improved     Polysubstance abuse-opioids, marijuana, benzodiazepine-watch for withdrawals-COWS protocol     Smoker-counseled    Leukocytosis-no evidence of infection, trending down    Mildly elevated AST- f/u as out pt     DVT Prophylaxis: Lovenox  Diet: DIET GENERAL;  Code Status: Full Code    PT/OT Eval Status: Ambulatory, not ordered yet    Dispo - today if MRI of the C-spine is normal and okay with neurology    Manav Ybarra MD

## 2018-09-02 NOTE — PROGRESS NOTES
Patient provided written and oral discharge instructions. Family present at beside at time of discharge. Patient was instructed to follow up with [facility] in 1 week for hospital follow up and to set up a regular PCP. Patient's IV and tele removed. Patient then walked out of the facility and discharged into the care of family.  Maria E Brown

## 2018-09-02 NOTE — DISCHARGE SUMMARY
Hospital Medicine Discharge Summary    Patient ID: Tracy Kaur      Patient's PCP: No primary care provider on file. Admit Date: 8/31/2018     Discharge Date: 9/2/2018      Admitting Physician: Eli Magana MD     Discharge Physician: Fausto Damian MD     Discharge Diagnoses: Active Hospital Problems    Diagnosis Date Noted    Dyslipidemia [E78.5]     ETOH abuse [F10.10]     TIA (transient ischemic attack) [G45.9] 08/31/2018    Weakness of left arm [R29.898] 08/31/2018    Polysubstance abuse [F19.10] 08/31/2018    Smoking [F17.200] 08/31/2018    Numbness and tingling of both feet [R20.0, R20.2] 08/31/2018       The patient was seen and examined on day of discharge and this discharge summary is in conjunction with any daily progress note from day of discharge. Hospital Course:   Left arm weakness and numbness-since 3 AM 8/31 -most probably local nerve injury rather than CVA-, given history of polysubstance abuse CVA need to be ruled out -admit, telemetry- no abnormalities, neurovascular check-symptoms improving,on aspirin, lipid profile, neurology evaluationAppreciated, MRI of the brain-no evidence of stroke,left arm sling to prevent injury to the left arm   MRI of the C-spine- no evidence of fracture and okay for discharge per neurology     Bilateral feet numbness-most probably polyneuropathy rather than focal cause -improved     Polysubstance abuse-opioids, marijuana, benzodiazepine-watch for withdrawals-COWS protocol     Smoker-counseled     Leukocytosis-no evidence of infection, trending down     Mildly elevated AST- f/u as out pt           Physical Exam Performed:     /65   Pulse 61   Temp 98.1 °F (36.7 °C) (Oral)   Resp 16   Ht 5' 7\" (1.702 m)   Wt 150 lb (68 kg)   SpO2 95%   BMI 23.49 kg/m²       General appearance: No apparent distress, appears stated age and cooperative. HEENT: Pupils equal, round, and reactive to light. Conjunctivae/corneas clear.   Neck: Supple, with full range of motion. No jugular venous distention. Trachea midline. Respiratory:  Normal respiratory effort. Clear to auscultation, bilaterally without Rales/Wheezes/Rhonchi. Cardiovascular: Regular rate and rhythm with normal S1/S2 without murmurs, rubs or gallops. Abdomen: Soft, non-tender, non-distended with normal bowel sounds. Musculoskeletal: No clubbing, cyanosis or edema bilaterally. Skin: Skin color, texture, turgor normal.  Abrasion over the left shoulder and in the armpit is improving no open area  Neurologic:  Left arm weakness and numbness. He is unable to move and left elbow down. And unable to feel from left elbow down. This improved compared to yesterday. . Cranial nerves: II-XII intact, grossly non-focal.  Psychiatric: Alert and oriented, thought content appropriate, normal insight  Capillary Refill: Brisk,< 3 seconds   Peripheral Pulses: +2 palpable, equal bilaterally       Labs: For convenience and continuity at follow-up the following most recent labs are provided:      CBC:    Lab Results   Component Value Date    WBC 15.6 08/31/2018    HGB 14.0 08/31/2018    HCT 41.7 08/31/2018     08/31/2018       Renal:    Lab Results   Component Value Date     08/31/2018    K 4.5 08/31/2018     08/31/2018    CO2 27 08/31/2018    BUN 21 08/31/2018    CREATININE 0.8 08/31/2018    CALCIUM 9.3 08/31/2018         Significant Diagnostic Studies    Radiology:   MRI CERVICAL SPINE W WO CONTRAST   Final Result   Normal cervical spine MRI. MRI brain without contrast   Final Result   No acute intracranial abnormality. Specifically, there is no acute infarct,   mass or hemorrhage.                 Consults:     IP CONSULT TO NEUROLOGY  IP CONSULT TO NEUROLOGY    Disposition:  Home    Condition at Discharge: Stable    Discharge Instructions/Follow-up:  PCP and neurologist    Code Status:  Prior     Activity: activity as tolerated    Diet: regular diet      Discharge

## 2018-09-05 LAB
EKG ATRIAL RATE: 81 BPM
EKG DIAGNOSIS: NORMAL
EKG P AXIS: 45 DEGREES
EKG P-R INTERVAL: 122 MS
EKG Q-T INTERVAL: 390 MS
EKG QRS DURATION: 94 MS
EKG QTC CALCULATION (BAZETT): 453 MS
EKG R AXIS: 75 DEGREES
EKG T AXIS: 46 DEGREES
EKG VENTRICULAR RATE: 81 BPM

## 2018-09-13 ENCOUNTER — OFFICE VISIT (OUTPATIENT)
Dept: OTHER | Age: 33
End: 2018-09-13

## 2018-09-13 ENCOUNTER — HOSPITAL ENCOUNTER (OUTPATIENT)
Age: 33
Discharge: HOME | End: 2018-09-13
Payer: MEDICAID

## 2018-09-13 VITALS
DIASTOLIC BLOOD PRESSURE: 80 MMHG | HEIGHT: 68 IN | BODY MASS INDEX: 23.19 KG/M2 | WEIGHT: 153 LBS | SYSTOLIC BLOOD PRESSURE: 120 MMHG | OXYGEN SATURATION: 97 % | HEART RATE: 97 BPM

## 2018-09-13 DIAGNOSIS — M54.10 RADICULOPATHY OF ARM: Primary | ICD-10-CM

## 2018-09-13 DIAGNOSIS — G56.32 NEUROPATHY OF LEFT RADIAL NERVE: ICD-10-CM

## 2018-09-13 LAB
HAV IGM SER IA-ACNC: NORMAL
HEPATITIS B CORE IGM ANTIBODY: NORMAL
HEPATITIS B SURFACE ANTIGEN INTERPRETATION: NORMAL
HEPATITIS C ANTIBODY INTERPRETATION: NORMAL

## 2018-09-13 PROCEDURE — 86701 HIV-1ANTIBODY: CPT

## 2018-09-13 PROCEDURE — 86702 HIV-2 ANTIBODY: CPT

## 2018-09-13 PROCEDURE — 80074 ACUTE HEPATITIS PANEL: CPT

## 2018-09-13 PROCEDURE — 87390 HIV-1 AG IA: CPT

## 2018-09-13 PROCEDURE — 36415 COLL VENOUS BLD VENIPUNCTURE: CPT

## 2018-09-13 ASSESSMENT — ENCOUNTER SYMPTOMS: EYES NEGATIVE: 1

## 2018-09-13 NOTE — PROGRESS NOTES
Department of Internal Medicine  OUTPATIENT CLINIC  Medical Resident Progress Note    Date of Service: 09/13/18  MRN: [id number]                                               HPI:     27 y.o. male presents to clinic today for:   Chief Complaint   Patient presents with    New Patient     referral from the Hospital for L arm numbness and unable to move upper lower extremity.  Other     Polysubstance abuse-opioids, marijuana, benzodiazepine    Results     normal Brain MRI and normal MRI cervical spine       Pt presents as follow up from hospital.  Seen at [facility] on 8/31 for numbness and weakness of left arm. TIA/stroke ruled out. Cervical trauma or cord stenosis ruled out. Pt continues to have symptoms of paresthesias, numbness, and weakness. It has not improved since being seen in the hospital.  He is unable to abduct his arm beyond 90 degrees and cannot move his forearm/hand/fingers. Sensation to the entire arm remains intact. He denies any recent trauma, fevers, chills, or bug bites. Review of Systems:   Review of Systems   Constitutional: Negative for chills, diaphoresis, fever, malaise/fatigue and weight loss. Eyes: Negative. Cardiovascular: Negative. Musculoskeletal: Negative. Skin: Negative. Neurological: Positive for tingling and weakness. Negative for sensory change. Physical Exam:     BP Readings from Last 3 Encounters:   09/13/18 120/80   09/02/18 107/65   08/31/18 122/87       Wt Readings from Last 3 Encounters:   09/13/18 153 lb (69.4 kg)   09/01/18 150 lb (68 kg)   08/31/18 150 lb (68 kg)       Vitals:  /80   Pulse 97   Ht 5' 8\" (1.727 m)   Wt 153 lb (69.4 kg)   SpO2 97%   BMI 23.26 kg/m²     PHYSICAL EXAM:  Physical Exam   Constitutional: He is oriented to person, place, and time. He appears well-developed and well-nourished. No distress. HENT:   Head: Normocephalic and atraumatic. Eyes: Pupils are equal, round, and reactive to light.  EOM are

## 2018-09-14 LAB
HIV AG/AB: NORMAL
HIV ANTIGEN: NORMAL
HIV-1 ANTIBODY: NORMAL
HIV-2 AB: NORMAL

## 2018-09-25 ENCOUNTER — APPOINTMENT (OUTPATIENT)
Dept: PHYSICAL THERAPY | Age: 33
End: 2018-09-25
Payer: MEDICAID

## 2018-09-25 ENCOUNTER — HOSPITAL ENCOUNTER (OUTPATIENT)
Dept: PHYSICAL THERAPY | Age: 33
Setting detail: THERAPIES SERIES
Discharge: HOME | End: 2018-09-25
Payer: MEDICAID

## 2018-09-25 PROCEDURE — G8985 CARRY GOAL STATUS: HCPCS

## 2018-09-25 PROCEDURE — 97161 PT EVAL LOW COMPLEX 20 MIN: CPT

## 2018-09-25 PROCEDURE — G8984 CARRY CURRENT STATUS: HCPCS

## 2018-09-25 PROCEDURE — 97110 THERAPEUTIC EXERCISES: CPT

## 2018-09-25 PROCEDURE — 97530 THERAPEUTIC ACTIVITIES: CPT

## 2018-09-25 NOTE — FLOWSHEET NOTE
Modalities:      Timed Code Treatment Minutes:  45    Total Treatment Minutes:  65  Medicare Cap Total YTD:      Treatment/Activity Tolerance:    [x] Patient tolerated treatment well [] Patient limited by fatigue   [] Patient limited by pain [] Patient limited by other medical complications   [] Other:     Prognosis: [x] Good [x] Fair  [] Poor    Patient Requires Follow-up:  [] Yes  [] No    Plan: [] Continue per plan of care [] Alter current plan (see comments)   [x] Plan of care initiated [] Hold pending MD visit [] Discharge    Plan for Next Session:  E stim, PROM, ex    See Progress Note: [] Yes  [x] No       Next due:    10/24/18     Electronically signed by:  Solo Jean-Baptiste, PT [id number]      Outpatient Physical Therapy  Progress Note    Date:  2018    Patient Name:  Beto Diaz      :  1985    Restrictions/Precautions:      Diagnosis:      Treatment Diagnosis:      Insurance/Certification information:      Referring Physician:      Plan of care signed (Y/N):      Visit# / total visits:  /    Pain level: /10    Progress Note covers period from: To date on this note. Subjective:         Objective:  Observation:  Test measurements:       GCODEs and Functional Outcome Measure:     PT G-Codes  Functional Limitation: Carrying, moving and handling objects  Carrying, Moving and Handling Objects Current Status (): At least 80 percent but less than 100 percent impaired, limited or restricted  Carrying, Moving and Handling Objects Goal Status ():  At least 20 percent but less than 40 percent impaired, limited or restricted      Assessment:  Summary:   Patient's response to treatment:      Goals:  · Progress toward previous goals:      Plan:  ·     Electronically Signed by: Solo Jean-Baptiste PT

## 2018-09-25 NOTE — PROGRESS NOTES
thorough hand management  GCode:   CM  Lift/carry    Problems     sensory changes   Decreased Strength   Decreased Functional Status   Increased Pain    Rehabilitation Potential: Good for goals listed below. Strengths for achieving goals include:  motivated  Limitations for achieving goals include:  Severity of his condition. Prognosis: [x]    Good [x]    Fair []    Poor    GOALS  GCode:    Short Term Goals ( 4   weeks) Long Term Goals ( 8  weeks)   1). Establish HEP 1). (I) HEP   2). promote muscle re ed with e stim 2). strength to 4/5   3). strength to 3/4 3). improve use 75%   4). improve use 25% 4). improve  and pinches   5). 5).   6). 6). PLAN OF CARE    To see patient  2-3  x/week for  4-8 weeks for the following treatment interventions:   Therapeutic Exercise   Progressive Resistive Exercise   Modalities of Choice (Heat/Cold/US/EStim/Ionto)   Home Exercise Program   Manual Techniques/Mobilization        Thank you for the referral of this patient.       Timed Code Treatment Minutes: 45 minutes      Total Treatment Time:  70 minutes    Baron Torres PT license #[id number]

## 2018-10-01 ENCOUNTER — HOSPITAL ENCOUNTER (OUTPATIENT)
Dept: PHYSICAL THERAPY | Age: 33
Setting detail: THERAPIES SERIES
Discharge: HOME | End: 2018-10-01
Payer: MEDICAID

## 2018-10-01 PROCEDURE — 97140 MANUAL THERAPY 1/> REGIONS: CPT

## 2018-10-01 PROCEDURE — 97110 THERAPEUTIC EXERCISES: CPT

## 2018-10-03 ENCOUNTER — HOSPITAL ENCOUNTER (OUTPATIENT)
Dept: PHYSICAL THERAPY | Age: 33
Setting detail: THERAPIES SERIES
Discharge: HOME | End: 2018-10-03
Payer: MEDICAID

## 2018-10-03 PROCEDURE — 97032 APPL MODALITY 1+ESTIM EA 15: CPT

## 2018-10-03 PROCEDURE — 97140 MANUAL THERAPY 1/> REGIONS: CPT

## 2018-10-03 NOTE — FLOWSHEET NOTE
.Outpatient Physical Therapy     [x] Daily Treatment Note   [] Progress Note   [] Discharge Note      Date:  10/3/2018    Patient Name:  Reg Blackwood        :  1985    Restrictions/Precautions:      Diagnosis:  Left arm meuropathy    Treatment Diagnosis:  same  Insurance/Certification information: georges     Referring Physician:  Dr. Mariya Kitchen of care signed (Y/N):      Visit# / total visits:  3  Pain level: 5-6/10 Has pain constantly     Subjective:  10/3/18  Last night had tingling post arm just inferior to elbow   10/1/18 reports not sleeping due to pain  18 states about 3 weeks ago he woke up and could not move his arm from the elbow down. He went to ED and f/u with Dr. Bernardo Cole. He has had MRIs/ CT scan  and referred to PT. States he has no movement from the elbow distally. Also has numbness in his R toes since 18 but able to move them normally.     Pain    Patient describes pain to be constant  Ache/pain from his elbow to his hand. Almost contant N&T in the same area. Patient reports  3-4/10 pain at rest,  8/10 pain with movement. Worsened by  - moving around  Improved by - being more quiet and moving around less  Current Functional Limitations:  Video games, can't tie his shoes. Can't use his left hand  PLOF:   independent        Patient goal for therapy: To gain use of his left arm.         Exercises:   Exercise/Equipment Resistance/Repetitions Other comments    18 explained course and ro le of PT        Explained anatomy/nerves int he UE       AROM of the left shoulder To prevent left shoulder stiffness      Gravity eliminated elbow flx/ext. Pro/sup. Wrist flx/ext 5 at a time  X 5 x a day      PROM: elbow, forearm, wrist, hand and fingers 5 reps. 5 x day    NV- E stim.   PROM, ex                                                                                                       Therapeutic Exercise:   min    Group Therapy:      Home Exercise Program:

## 2018-10-08 ENCOUNTER — HOSPITAL ENCOUNTER (OUTPATIENT)
Dept: NEUROLOGY | Age: 33
Discharge: HOME | End: 2018-10-08
Payer: MEDICAID

## 2018-10-08 ENCOUNTER — HOSPITAL ENCOUNTER (OUTPATIENT)
Dept: PHYSICAL THERAPY | Age: 33
Setting detail: THERAPIES SERIES
Discharge: HOME | End: 2018-10-08
Payer: MEDICAID

## 2018-10-08 DIAGNOSIS — M54.10 RADICULOPATHY OF ARM: ICD-10-CM

## 2018-10-08 PROCEDURE — 95886 MUSC TEST DONE W/N TEST COMP: CPT

## 2018-10-08 PROCEDURE — 97140 MANUAL THERAPY 1/> REGIONS: CPT

## 2018-10-08 PROCEDURE — 95908 NRV CNDJ TST 3-4 STUDIES: CPT

## 2018-10-08 PROCEDURE — 97032 APPL MODALITY 1+ESTIM EA 15: CPT

## 2018-10-10 ENCOUNTER — HOSPITAL ENCOUNTER (OUTPATIENT)
Dept: PHYSICAL THERAPY | Age: 33
Setting detail: THERAPIES SERIES
Discharge: HOME | End: 2018-10-10
Payer: MEDICAID

## 2018-10-10 PROCEDURE — 97140 MANUAL THERAPY 1/> REGIONS: CPT

## 2018-10-10 PROCEDURE — G0283 ELEC STIM OTHER THAN WOUND: HCPCS

## 2018-10-12 ENCOUNTER — HOSPITAL ENCOUNTER (OUTPATIENT)
Dept: PHYSICAL THERAPY | Age: 33
Setting detail: THERAPIES SERIES
Discharge: HOME | End: 2018-10-12
Payer: MEDICAID

## 2018-10-12 PROCEDURE — G0283 ELEC STIM OTHER THAN WOUND: HCPCS

## 2018-10-12 PROCEDURE — 97140 MANUAL THERAPY 1/> REGIONS: CPT

## 2018-10-12 NOTE — FLOWSHEET NOTE
Subjective:         Objective:  Observation:  Test measurements:       GCODEs and Functional Outcome Measure:            Assessment:  Summary:   Patient's response to treatment:      Goals:  · Progress toward previous goals:      Plan:  ·     Electronically Signed by: , PT

## 2018-10-15 ENCOUNTER — HOSPITAL ENCOUNTER (OUTPATIENT)
Dept: PHYSICAL THERAPY | Age: 33
Setting detail: THERAPIES SERIES
Discharge: HOME | End: 2018-10-15
Payer: MEDICAID

## 2018-10-15 PROCEDURE — G0283 ELEC STIM OTHER THAN WOUND: HCPCS

## 2018-10-15 PROCEDURE — 97140 MANUAL THERAPY 1/> REGIONS: CPT

## 2018-10-16 ENCOUNTER — OFFICE VISIT (OUTPATIENT)
Dept: INTERNAL MEDICINE CLINIC | Age: 33
End: 2018-10-16

## 2018-10-16 VITALS
RESPIRATION RATE: 16 BRPM | HEART RATE: 92 BPM | DIASTOLIC BLOOD PRESSURE: 91 MMHG | SYSTOLIC BLOOD PRESSURE: 148 MMHG | OXYGEN SATURATION: 98 % | BODY MASS INDEX: 21.9 KG/M2 | WEIGHT: 144 LBS

## 2018-10-16 DIAGNOSIS — G56.32 NEUROPATHY OF LEFT RADIAL NERVE: Primary | ICD-10-CM

## 2018-10-16 DIAGNOSIS — G47.01 INSOMNIA DUE TO MEDICAL CONDITION: ICD-10-CM

## 2018-10-16 DIAGNOSIS — G62.9 NEUROPATHY: ICD-10-CM

## 2018-10-16 RX ORDER — AMITRIPTYLINE HYDROCHLORIDE 25 MG/1
25 TABLET, FILM COATED ORAL NIGHTLY
Qty: 30 TABLET | Refills: 3 | Status: SHIPPED | OUTPATIENT
Start: 2018-10-16 | End: 2018-12-04 | Stop reason: SDUPTHER

## 2018-10-16 NOTE — PATIENT INSTRUCTIONS
1. 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 Continue with physical therapy. 2. Difficulty sleeping due to neuropathy:  Take amitriptyline (Elavil) 25 mg tablet every night at bedtime.        Follow-up in a month

## 2018-10-17 ENCOUNTER — HOSPITAL ENCOUNTER (OUTPATIENT)
Dept: PHYSICAL THERAPY | Age: 33
Setting detail: THERAPIES SERIES
Discharge: HOME | End: 2018-10-17
Payer: MEDICAID

## 2018-10-17 PROCEDURE — 97140 MANUAL THERAPY 1/> REGIONS: CPT

## 2018-10-17 PROCEDURE — G0283 ELEC STIM OTHER THAN WOUND: HCPCS

## 2018-10-19 ENCOUNTER — HOSPITAL ENCOUNTER (OUTPATIENT)
Dept: PHYSICAL THERAPY | Age: 33
Setting detail: THERAPIES SERIES
Discharge: HOME | End: 2018-10-19
Payer: MEDICAID

## 2018-10-19 PROCEDURE — 97140 MANUAL THERAPY 1/> REGIONS: CPT

## 2018-10-19 PROCEDURE — G0283 ELEC STIM OTHER THAN WOUND: HCPCS

## 2018-10-19 NOTE — FLOWSHEET NOTE
No    Plan: [x] Continue per plan of care [] Alter current plan (see comments)   [] Plan of care initiated [] Hold pending MD visit [] Discharge    Plan for Next Session:  E stim, PROM, ex. Going to have him see hand therapist next week for eval and splinting. See Progress Note: [] Yes  [x] No       Next due:    10/24/18     Electronically signed by:  Jennifer Oscar, [address] [place] [address] Road          Date:     Patient Name:  Kayla Arana      :  1985    Visit# / total visits:  /    Pain level: /10    Progress Note covers period from:        Subjective:         Objective:  Observation:  Test measurements:       GCODEs and Functional Outcome Measure:            Assessment:  Summary:   Patient's response to treatment:      Goals  GCode:    Short Term Goals ( 4   weeks) Long Term Goals ( 8  weeks)   1). Establish HEP 1). (I) HEP   2). promote muscle re ed with e stim 2). strength to 4/5   3). strength to 3/4 3). improve use 75%   4). improve use 25% 4). improve  and pinches   5).  5).   6). 6).        · Progress toward previous goals:      Plan:  ·     Electronically Signed by: , PT

## 2018-10-22 ENCOUNTER — TELEPHONE (OUTPATIENT)
Dept: INTERNAL MEDICINE CLINIC | Age: 33
End: 2018-10-22

## 2018-10-22 ENCOUNTER — HOSPITAL ENCOUNTER (OUTPATIENT)
Dept: PHYSICAL THERAPY | Age: 33
Setting detail: THERAPIES SERIES
Discharge: HOME | End: 2018-10-22
Payer: MEDICAID

## 2018-10-22 DIAGNOSIS — G58.9 NERVE PALSY: Primary | ICD-10-CM

## 2018-10-22 PROCEDURE — G8984 CARRY CURRENT STATUS: HCPCS

## 2018-10-22 PROCEDURE — G0283 ELEC STIM OTHER THAN WOUND: HCPCS

## 2018-10-22 PROCEDURE — 97140 MANUAL THERAPY 1/> REGIONS: CPT

## 2018-10-22 PROCEDURE — G8985 CARRY GOAL STATUS: HCPCS

## 2018-10-22 NOTE — PROGRESS NOTES
goals: STGs 1,2 met     Plan: cont for 1 more month  ·     Electronically Signed by: Laura Jaimes [id number]/Aditi Oliver [id number]

## 2018-10-23 ENCOUNTER — APPOINTMENT (OUTPATIENT)
Dept: PHYSICAL THERAPY | Age: 33
End: 2018-10-23
Payer: MEDICAID

## 2018-10-23 ENCOUNTER — HOSPITAL ENCOUNTER (OUTPATIENT)
Dept: OCCUPATIONAL THERAPY | Age: 33
Setting detail: THERAPIES SERIES
Discharge: HOME | End: 2018-10-23
Payer: MEDICAID

## 2018-10-23 PROCEDURE — L3906 WHO W/O JOINTS CF: HCPCS | Performed by: OCCUPATIONAL THERAPIST

## 2018-10-23 PROCEDURE — L3808 WHFO, RIGID W/O JOINTS: HCPCS | Performed by: OCCUPATIONAL THERAPIST

## 2018-10-23 NOTE — PLAN OF CARE
disinterested  []Poor retention of information  []Appeared anxious/ fearful    Assessment and Plan:  Goals: [x]Patient will be able to verbalize rationale for, and demonstrate proper wearing     of splint. [x]Splint will provide proper fit and function. []Patient will be able to verbalize 2-3 ways to prevent further symptoms. [x]Patient will be able to don and doff independently. []Patient will be independent with HEP    Goals met:  [x]yes []no    Plan:  [x]Splint completed with good fit and function. Hand Therapy to follow up for     splint modifications as needed. Patient is planning on continuing PT in [facility] as it is closer to home and he cannot afford gas to come to this facility. []Splint completed; OT/PT evaluation initiated. Patient to return for further     treatment.     Baron Voss , [id number] [facility]

## 2018-10-24 ENCOUNTER — HOSPITAL ENCOUNTER (OUTPATIENT)
Dept: PHYSICAL THERAPY | Age: 33
Setting detail: THERAPIES SERIES
Discharge: HOME | End: 2018-10-24
Payer: MEDICAID

## 2018-10-24 PROCEDURE — 97140 MANUAL THERAPY 1/> REGIONS: CPT

## 2018-10-24 PROCEDURE — G0283 ELEC STIM OTHER THAN WOUND: HCPCS

## 2018-10-24 NOTE — PROGRESS NOTES
.Outpatient Physical Therapy     [x] Daily Treatment Note   [x] Progress Note 10/22/18  [] Discharge Note      Date:  10/24/2018    Patient Name:  Yahaira Hall        :  1985    Restrictions/Precautions:      Diagnosis:  Left arm meuropathy    Treatment Diagnosis:  same  Insurance/Certification information: georges     Referring Physician:  Dr. Arabella Murphy of care signed (Y/N):      Visit# / total visits:  10     Pain level: 8/10 currently     4-10/10 depending on the day    Subjective: 10/22/18  Reports the cold weather is really hurting his arm, not even liking his jacket on this morning. Reports he is not feeling any improvements at all. Reports they do not have to gas money to go to OT 2-3 x a wk. 10/19/18 doing about the same. Sensory stimulation to the left arm is irritating and painful. He continues with it.  10/17/18 has had just over 3 week of therapy. Minimal success. Continues with arm pain   10/15/18 pt reports he feels the sx are getting worse and that his hand/fingers are changing color. Reports a part of the dorsum of his hand was so hot it was almost sweating the other day. Does not get much rest at night due to arm   10/12/18 doing about the same. 10/10/18 EMG results in epic. Summary of EMG and Nerve Conduction Findings: The above nerve conduction studies demonstrate absent median and motor evoked responses. EMG needle exam demonstrates mild muscle membrane irritability in biceps muscle and all muscles distal to elbow. No voluntary motor units were appreciated.      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 Thank you.      10/8/18  Reports he did not sleep well at all, having EMG today. Toes are no longer numb  10/3/18  Last night had tingling post arm just inferior to elbow  10/1/18 reports not sleeping due to pain  18 states about 3 weeks ago he woke up and could not move his arm from the elbow down. He went to ED and f/u with Dr. Ivonne Irwin. He has had MRIs/ CT scan  and referred to PT. States he has no movement from the elbow distally. Also has numbness in his R toes since 8/31/18 but able to move them normally.     Pain    Patient describes pain to be constant  Ache/pain from his elbow to his hand. Almost contant N&T in the same area. Patient reports  3-4/10 pain at rest,  8/10 pain with movement. Worsened by  - moving around  Improved by - being more quiet and moving around less  Current Functional Limitations:  Video games, can't tie his shoes. Can't use his left hand  PLOF:   independent        Patient goal for therapy: To gain use of his left arm.         Exercises:   Exercise/Equipment Resistance/Repetitions Other comments    9/25/18 explained course and ro le of PT        Explained anatomy/nerves int he UE       AROM of the left shoulder To prevent left shoulder stiffness      Gravity eliminated elbow flx/ext. Pro/sup. Wrist flx/ext 5 at a time  X 5 x a day      PROM: elbow, forearm, wrist, hand and fingers 5 reps. 5 x day    NV- E stim. PROM, ex                                                                                                       Therapeutic Exercise:   min    Group Therapy:      Home Exercise Program:      Therapeutic Activity:  education    Neuromuscular Re-education:      Gait:      Manual Therapy:  30 PROM, stretching to thumb abd and ext. desensitization.  Finding mo tor points for E stim    Modalities:   60 min   15 min at a time to wrist ext, biceps and wrist flx, 5th finger, triceps    Attended E- stim for re education, with facilitation in wrist ext and biceps, twitch in tricep and flex of ring ringer    Timed Code Treatment Minutes: 30    Total Treatment Minutes:  84      2 man,1  E- stim  Medicare Cap Total YTD:      Treatment/Activity Tolerance:    [x] Patient tolerated treatment well [] Patient limited by fatigue   [] Patient limited by pain [] Patient limited

## 2018-10-24 NOTE — FLOWSHEET NOTE
.Outpatient Physical Therapy     [x] Daily Treatment Note   [x] Progress Note 10/22/18  [] Discharge Note      Date:  10/24/2018    Patient Name:  Selena Lo        :  1985    Restrictions/Precautions:      Diagnosis:  Left arm meuropathy    Treatment Diagnosis:  same  Insurance/Certification information: [place]     Referring Physician:  Dr. Isamar Simon of care signed (Y/N):      Visit# / total visits:  11     Pain level: 8/10 currently     4-10/10 depending on the day    Subjective: 10/22/18  Reports the cold weather is really hurting his arm, not even liking his jacket on this morning. Reports he is not feeling any improvements at all. Reports they do not have to gas money to go to OT 2-3 x a wk. 10/19/18 doing about the same. Sensory stimulation to the left arm is irritating and painful. He continues with it.  10/17/18 has had just over 3 week of therapy. Minimal success. Continues with arm pain   10/15/18 pt reports he feels the sx are getting worse and that his hand/fingers are changing color. Reports a part of the dorsum of his hand was so hot it was almost sweating the other day. Does not get much rest at night due to arm   10/12/18 doing about the same. 10/10/18 EMG results in epic. Summary of EMG and Nerve Conduction Findings: The above nerve conduction studies demonstrate absent median and motor evoked responses. EMG needle exam demonstrates mild muscle membrane irritability in biceps muscle and all muscles distal to elbow. No voluntary motor units were appreciated.      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 Thank you.      10/8/18  Reports he did not sleep well at all, having EMG today. Toes are no longer numb  10/3/18  Last night had tingling post arm just inferior to elbow  10/1/18 reports not sleeping due to pain  18 states about 3 weeks ago he woke up and could not move his arm from the elbow down.

## 2018-10-26 ENCOUNTER — HOSPITAL ENCOUNTER (OUTPATIENT)
Dept: PHYSICAL THERAPY | Age: 33
Setting detail: THERAPIES SERIES
Discharge: HOME | End: 2018-10-26
Payer: MEDICAID

## 2018-10-26 PROCEDURE — G0283 ELEC STIM OTHER THAN WOUND: HCPCS

## 2018-10-26 PROCEDURE — 97140 MANUAL THERAPY 1/> REGIONS: CPT

## 2018-10-26 NOTE — FLOWSHEET NOTE
.Outpatient Physical Therapy     [x] Daily Treatment Note   [] Progress Note 10/22/18  [] Discharge Note      Date:  10/26/2018    Patient Name:  Abel Huffman        :  1985    Restrictions/Precautions:      Diagnosis:  Left arm meuropathy    Treatment Diagnosis:  same  Insurance/Certification information: [place]     Referring Physician:  Dr. Sher Boudreaux of care signed (Y/N):      Visit# / total visits:   12     Pain level: 8/10 currently     4-10/10 depending on the day    Subjective: 10/26/18 imporving biceps and triceps. Able to stimulate the finger and thumb just a little now. Flexion is very limited  10/24/18 has 2 splints now. Wearing it- fits well. The one at home offers more finger support- he will use it intermit. Starting to have more triceps strength. 10/22/18  Reports the cold weather is really hurting his arm, not even liking his jacket on this morning. Reports he is not feeling any improvements at all. Reports they do not have to gas money to go to OT 2-3 x a wk. 10/19/18 doing about the same. Sensory stimulation to the left arm is irritating and painful. He continues with it.  10/17/18 has had just over 3 week of therapy. Minimal success. Continues with arm pain   10/15/18 pt reports he feels the sx are getting worse and that his hand/fingers are changing color. Reports a part of the dorsum of his hand was so hot it was almost sweating the other day. Does not get much rest at night due to arm   10/12/18 doing about the same. 10/10/18 EMG results in epic. Summary of EMG and Nerve Conduction Findings: The above nerve conduction studies demonstrate absent median and motor evoked responses. EMG needle exam demonstrates mild muscle membrane irritability in biceps muscle and all muscles distal to elbow. No voluntary motor units were appreciated.      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 Thank you.    10/8/18  Reports he did not sleep well at all, having EMG today. Toes are no longer numb  10/3/18  Last night had tingling post arm just inferior to elbow  10/1/18 reports not sleeping due to pain  9/25/18 states about 3 weeks ago he woke up and could not move his arm from the elbow down. He went to ED and f/u with Dr. Dale Valdez. He has had MRIs/ CT scan  and referred to PT. States he has no movement from the elbow distally. Also has numbness in his R toes since 8/31/18 but able to move them normally.     Pain    Patient describes pain to be constant  Ache/pain from his elbow to his hand. Almost contant N&T in the same area. Patient reports  3-4/10 pain at rest,  8/10 pain with movement. Worsened by  - moving around  Improved by - being more quiet and moving around less  Current Functional Limitations:  Video games, can't tie his shoes. Can't use his left hand  PLOF:   independent        Patient goal for therapy: To gain use of his left arm.         Exercises:   Exercise/Equipment Resistance/Repetitions Other comments    9/25/18 explained course and ro le of PT        Explained anatomy/nerves int he UE       AROM of the left shoulder To prevent left shoulder stiffness      Gravity eliminated elbow flx/ext. Pro/sup. Wrist flx/ext 5 at a time  X 5 x a day      PROM: elbow, forearm, wrist, hand and fingers 5 reps. 5 x day    NV- E stim. PROM, ex                                                                                                       Therapeutic Exercise:   min    Group Therapy:      Home Exercise Program:      Therapeutic Activity:  education    Neuromuscular Re-education:      Gait:      Manual Therapy:  30 PROM, stretching to thumb abd and ext. desensitization.  Finding mo tor points for E stim    Modalities:   60 min   15 min at a time to wrist ext, biceps and wrist flx, 5th finger, triceps    Attended E- stim for re education, with facilitation in wrist ext and biceps, twitch in tricep and flex of ring ringer    Timed Code Treatment Minutes: 30    Total Treatment Minutes:   70      2 man,1  E- stim  Medicare Cap Total YTD:      Treatment/Activity Tolerance:    [x] Patient tolerated treatment well [] Patient limited by fatigue   [] Patient limited by pain [] Patient limited by other medical complications   [] Other:     Prognosis: [x] Good [x] Fair  [] Poor    Patient Requires Follow-up:  [x] Yes  [] No    Plan: [x] Continue per plan of care [] Alter current plan (see comments)   [] Plan of care initiated [] Hold pending MD visit [] Discharge    Plan for Next Session:  E stim, PROM, ex     See Progress Note: [] Yes  [x] No       Next due:    10/24/18     Electronically signed by:  Xuan Aburto, PT [id number]                Progress Note covers period from:    9/25/18-10/22/18    Subjective: he is having more sensory pain, changes in color of his hand,  Increased sensitivity to touch, hot,cold. These problems could be RSD related. Objective:  Observation: the left hand is flaccid. Have recommended OT evaluation and fabrication of a splint. Test measurements:  No improvement with strength . He has been seen for a month. GCODEs and Functional Outcome Measure:            Assessment:  Summary: Able to illicit some movement with neuromuscular re education electrical stimulation. This has not carried over to active movement  Patient's response to treatment: pain with treatment     Goals  GCode:    Short Term Goals ( 4   weeks) Long Term Goals ( 8  weeks)   1). Establish HEP 1). (I) HEP   2). promote muscle re ed with e stim 2). strength to 4/5   3). strength to 3/4 3). improve use 75%   4). improve use 25% 4). improve  and pinches   5).  5).   6). 6).        · Progress toward previous goals: STGs 1,2 met     Plan: cont for 1 more month  ·     Electronically Signed by: Cintia Villa [id number]/Aditi Oliver [id number]

## 2018-10-29 ENCOUNTER — APPOINTMENT (OUTPATIENT)
Dept: PHYSICAL THERAPY | Age: 33
End: 2018-10-29
Payer: MEDICAID

## 2018-10-29 ENCOUNTER — HOSPITAL ENCOUNTER (OUTPATIENT)
Dept: PHYSICAL THERAPY | Age: 33
Setting detail: THERAPIES SERIES
Discharge: HOME | End: 2018-10-29
Payer: MEDICAID

## 2018-10-29 PROCEDURE — G0283 ELEC STIM OTHER THAN WOUND: HCPCS

## 2018-10-29 PROCEDURE — 97140 MANUAL THERAPY 1/> REGIONS: CPT

## 2018-10-29 NOTE — FLOWSHEET NOTE
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 Thank you.      10/8/18  Reports he did not sleep well at all, having EMG today. Toes are no longer numb  10/3/18  Last night had tingling post arm just inferior to elbow  10/1/18 reports not sleeping due to pain  9/25/18 states about 3 weeks ago he woke up and could not move his arm from the elbow down. He went to ED and f/u with Dr. Tayo Cota. He has had MRIs/ CT scan  and referred to PT. States he has no movement from the elbow distally. Also has numbness in his R toes since 8/31/18 but able to move them normally.     Pain    Patient describes pain to be constant  Ache/pain from his elbow to his hand. Almost contant N&T in the same area. Patient reports  3-4/10 pain at rest,  8/10 pain with movement. Worsened by  - moving around  Improved by - being more quiet and moving around less  Current Functional Limitations:  Video games, can't tie his shoes. Can't use his left hand  PLOF:   independent        Patient goal for therapy: To gain use of his left arm.         Exercises:   Exercise/Equipment Resistance/Repetitions Other comments    9/25/18 explained course and ro le of PT        Explained anatomy/nerves int he UE       AROM of the left shoulder To prevent left shoulder stiffness      Gravity eliminated elbow flx/ext. Pro/sup. Wrist flx/ext 5 at a time  X 5 x a day      PROM: elbow, forearm, wrist, hand and fingers 5 reps. 5 x day    NV- E stim. PROM, ex                                                                                                       Therapeutic Exercise:   min    Group Therapy:      Home Exercise Program:      Therapeutic Activity:  education    Neuromuscular Re-education:      Gait:      Manual Therapy:  30 PROM, stretching to thumb abd and ext. desensitization.  Finding mo tor points for E stim    Modalities:   60 min   15 min at a time to wrist ext, biceps and wrist flx, 5th finger, thumb

## 2018-10-30 ENCOUNTER — APPOINTMENT (OUTPATIENT)
Dept: PHYSICAL THERAPY | Age: 33
End: 2018-10-30
Payer: MEDICAID

## 2018-10-31 ENCOUNTER — APPOINTMENT (OUTPATIENT)
Dept: PHYSICAL THERAPY | Age: 33
End: 2018-10-31
Payer: MEDICAID

## 2018-11-02 ENCOUNTER — APPOINTMENT (OUTPATIENT)
Dept: PHYSICAL THERAPY | Age: 33
End: 2018-11-02
Payer: MEDICAID

## 2018-11-05 ENCOUNTER — APPOINTMENT (OUTPATIENT)
Dept: PHYSICAL THERAPY | Age: 33
End: 2018-11-05
Payer: MEDICAID

## 2018-11-06 ENCOUNTER — APPOINTMENT (OUTPATIENT)
Dept: PHYSICAL THERAPY | Age: 33
End: 2018-11-06
Payer: MEDICAID

## 2018-11-07 ENCOUNTER — HOSPITAL ENCOUNTER (OUTPATIENT)
Dept: PHYSICAL THERAPY | Age: 33
Setting detail: THERAPIES SERIES
Discharge: HOME | End: 2018-11-07
Payer: MEDICAID

## 2018-11-07 ENCOUNTER — APPOINTMENT (OUTPATIENT)
Dept: PHYSICAL THERAPY | Age: 33
End: 2018-11-07
Payer: MEDICAID

## 2018-11-09 ENCOUNTER — APPOINTMENT (OUTPATIENT)
Dept: PHYSICAL THERAPY | Age: 33
End: 2018-11-09
Payer: MEDICAID

## 2018-11-12 ENCOUNTER — HOSPITAL ENCOUNTER (OUTPATIENT)
Dept: PHYSICAL THERAPY | Age: 33
Setting detail: THERAPIES SERIES
Discharge: HOME | End: 2018-11-12
Payer: MEDICAID

## 2018-11-12 ENCOUNTER — APPOINTMENT (OUTPATIENT)
Dept: PHYSICAL THERAPY | Age: 33
End: 2018-11-12
Payer: MEDICAID

## 2018-11-12 PROCEDURE — G0283 ELEC STIM OTHER THAN WOUND: HCPCS

## 2018-11-12 PROCEDURE — 97140 MANUAL THERAPY 1/> REGIONS: CPT

## 2018-11-12 NOTE — FLOWSHEET NOTE
muscle and all muscles distal to elbow. No voluntary motor units were appreciated.      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 Thank you.      10/8/18  Reports he did not sleep well at all, having EMG today. Toes are no longer numb  10/3/18  Last night had tingling post arm just inferior to elbow  10/1/18 reports not sleeping due to pain  9/25/18 states about 3 weeks ago he woke up and could not move his arm from the elbow down. He went to ED and f/u with Dr. Karen Hancock. He has had MRIs/ CT scan  and referred to PT. States he has no movement from the elbow distally. Also has numbness in his R toes since 8/31/18 but able to move them normally.     Pain    Patient describes pain to be constant  Ache/pain from his elbow to his hand. Almost contant N&T in the same area. Patient reports  3-4/10 pain at rest,  8/10 pain with movement. Worsened by  - moving around  Improved by - being more quiet and moving around less  Current Functional Limitations:  Video games, can't tie his shoes. Can't use his left hand  PLOF:   independent        Patient goal for therapy: To gain use of his left arm.         Exercises:   Exercise/Equipment Resistance/Repetitions Other comments    9/25/18 explained course and ro le of PT        Explained anatomy/nerves int he UE       AROM of the left shoulder To prevent left shoulder stiffness      Gravity eliminated elbow flx/ext. Pro/sup. Wrist flx/ext 5 at a time  X 5 x a day      PROM: elbow, forearm, wrist, hand and fingers 5 reps. 5 x day    NV- E stim.   PROM, ex                                                                                                       Therapeutic Exercise:   2 min   Pt now able to do some AROM  Gravity reduced biceps, triceps, wrist ext, RD/UD and thumb circumduction    Group Therapy:      Home Exercise Program:      Therapeutic Activity:  education    Neuromuscular Re-education:

## 2018-11-13 ENCOUNTER — OFFICE VISIT (OUTPATIENT)
Dept: INTERNAL MEDICINE CLINIC | Age: 33
End: 2018-11-13

## 2018-11-13 VITALS
SYSTOLIC BLOOD PRESSURE: 137 MMHG | WEIGHT: 147 LBS | DIASTOLIC BLOOD PRESSURE: 85 MMHG | BODY MASS INDEX: 22.35 KG/M2 | HEART RATE: 104 BPM | OXYGEN SATURATION: 97 %

## 2018-11-13 DIAGNOSIS — Z53.8 APPOINTMENT CANCELED BY HOSPITAL: Primary | ICD-10-CM

## 2018-11-14 ENCOUNTER — HOSPITAL ENCOUNTER (OUTPATIENT)
Dept: PHYSICAL THERAPY | Age: 33
Setting detail: THERAPIES SERIES
Discharge: HOME | End: 2018-11-14
Payer: MEDICAID

## 2018-11-14 ENCOUNTER — APPOINTMENT (OUTPATIENT)
Dept: PHYSICAL THERAPY | Age: 33
End: 2018-11-14
Payer: MEDICAID

## 2018-11-14 PROCEDURE — 97140 MANUAL THERAPY 1/> REGIONS: CPT

## 2018-11-14 PROCEDURE — G0283 ELEC STIM OTHER THAN WOUND: HCPCS

## 2018-11-14 NOTE — FLOWSHEET NOTE
median and motor evoked responses. EMG needle exam demonstrates mild muscle membrane irritability in biceps muscle and all muscles distal to elbow. No voluntary motor units were appreciated.      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 Thank you.      10/8/18  Reports he did not sleep well at all, having EMG today. Toes are no longer numb  10/3/18  Last night had tingling post arm just inferior to elbow  10/1/18 reports not sleeping due to pain  9/25/18 states about 3 weeks ago he woke up and could not move his arm from the elbow down. He went to ED and f/u with Dr. Juana Rider. He has had MRIs/ CT scan  and referred to PT. States he has no movement from the elbow distally. Also has numbness in his R toes since 8/31/18 but able to move them normally.     Pain    Patient describes pain to be constant  Ache/pain from his elbow to his hand. Almost contant N&T in the same area. Patient reports  3-4/10 pain at rest,  8/10 pain with movement. Worsened by  - moving around  Improved by - being more quiet and moving around less  Current Functional Limitations:  Video games, can't tie his shoes. Can't use his left hand  PLOF:   independent        Patient goal for therapy: To gain use of his left arm.         Exercises:   Exercise/Equipment Resistance/Repetitions Other comments    9/25/18 explained course and ro le of PT        Explained anatomy/nerves int he UE       AROM of the left shoulder To prevent left shoulder stiffness      Gravity eliminated elbow flx/ext. Pro/sup. Wrist flx/ext 5 at a time  X 5 x a day      PROM: elbow, forearm, wrist, hand and fingers 5 reps. 5 x day    NV- E stim.   PROM, ex                                                                                                       Therapeutic Exercise:   2 min   Pt now able to do some AROM  Gravity reduced biceps, triceps, wrist ext, RD/UD and thumb circumduction    Group

## 2018-11-16 ENCOUNTER — HOSPITAL ENCOUNTER (OUTPATIENT)
Dept: PHYSICAL THERAPY | Age: 33
Setting detail: THERAPIES SERIES
Discharge: HOME | End: 2018-11-16
Payer: MEDICAID

## 2018-11-16 PROCEDURE — 97140 MANUAL THERAPY 1/> REGIONS: CPT

## 2018-11-16 PROCEDURE — G0283 ELEC STIM OTHER THAN WOUND: HCPCS

## 2018-11-19 ENCOUNTER — APPOINTMENT (OUTPATIENT)
Dept: PHYSICAL THERAPY | Age: 33
End: 2018-11-19
Payer: MEDICAID

## 2018-11-20 ENCOUNTER — HOSPITAL ENCOUNTER (OUTPATIENT)
Dept: PHYSICAL THERAPY | Age: 33
Setting detail: THERAPIES SERIES
Discharge: HOME | End: 2018-11-20
Payer: MEDICAID

## 2018-11-20 PROCEDURE — 97140 MANUAL THERAPY 1/> REGIONS: CPT

## 2018-11-20 PROCEDURE — G0283 ELEC STIM OTHER THAN WOUND: HCPCS

## 2018-11-20 NOTE — PROGRESS NOTES
.Outpatient Physical Therapy     [x] Daily Treatment Note   [x] Progress Note 10/22/18,   18   [] Discharge Note      Date:  2018    Patient Name:  Abel Huffman        :  1985    Restrictions/Precautions:      Diagnosis:  Left arm meuropathy    Treatment Diagnosis:  same  Insurance/Certification information: [place]     Referring Physician:  Dr. Sher Boudreaux of care signed (Y/N):      Visit# / total visits:   14     Pain level: 8/10 currently     4-10/10 depending on the day    Subjective: 18 prog note done .  18 doing better with some active motion. 18  Reports he can get more range on his biceps curl and pron/sup with total gravity reduced position  18  Reports he is making improvements with the amount he is able to move and the control over the movement  10/29/18 reports he is improving with desensitization and tricep movement with gravity removed  10/26/18 imporving biceps and triceps. Able to stimulate the finger and thumb just a little now. Flexion is very limited  10/24/18 has 2 splints now. Wearing it- fits well. The one at home offers more finger support- he will use it intermit. Starting to have more triceps strength. 10/22/18  Reports the cold weather is really hurting his arm, not even liking his jacket on this morning. Reports he is not feeling any improvements at all. Reports they do not have to gas money to go to OT 2-3 x a wk. 10/19/18 doing about the same. Sensory stimulation to the left arm is irritating and painful. He continues with it.  10/17/18 has had just over 3 week of therapy. Minimal success. Continues with arm pain   10/15/18 pt reports he feels the sx are getting worse and that his hand/fingers are changing color. Reports a part of the dorsum of his hand was so hot it was almost sweating the other day. Does not get much rest at night due to arm   10/12/18 doing about the same. 10/10/18 EMG results in epic.   Summary

## 2018-11-21 ENCOUNTER — HOSPITAL ENCOUNTER (OUTPATIENT)
Dept: PHYSICAL THERAPY | Age: 33
Setting detail: THERAPIES SERIES
Discharge: HOME | End: 2018-11-21
Payer: MEDICAID

## 2018-11-21 PROCEDURE — 97110 THERAPEUTIC EXERCISES: CPT

## 2018-11-21 PROCEDURE — G0283 ELEC STIM OTHER THAN WOUND: HCPCS

## 2018-11-21 PROCEDURE — 97140 MANUAL THERAPY 1/> REGIONS: CPT

## 2018-11-21 NOTE — FLOWSHEET NOTE
Biceps curl from 0 1x5  As able    Biceps from 90 1x5    Supine triceps 1# 3x5    Seated triceps  Yellow band inst     arm down RD/UD  1x5    Can rolling hand fingers   unlimited                                       15 min         Instructed pt when doing hand and finer ex try to do with both hands at same time for re-education. Reminders to try to do movement with estim re-ed    Group Therapy:      Home Exercise Program:      Therapeutic Activity:  education    Neuromuscular Re-education:      Gait:      Manual Therapy:    15 min  PROM, stretching to thumb abd and ext. desensitization. Finding mo tor points for E stim    Modalities:   60 min   15 min at a time to wrist ext, biceps and wrist flx, 5th finger, thumb    Attended E- stim for re education    Timed Code Treatment Minutes:  30    Total Treatment Minutes:    80    1 man, 1 ex   1 E- stim       Medicare Cap Total YTD:      Treatment/Activity Tolerance:    [x] Patient tolerated treatment well [] Patient limited by fatigue   [] Patient limited by pain [] Patient limited by other medical complications   [] Other:     Prognosis: [x] Good [x] Fair  [] Poor    Patient Requires Follow-up:  [x] Yes  [] No    Plan: [x] Continue per plan of care [] Alter current plan (see comments)   [] Plan of care initiated [] Hold pending MD visit [] Discharge    Plan for Next Session:  E stim, PROM, ex     See Progress Note: [] Yes  [x] No       Next due:    11/21/18     Electronically signed by:  Sadaf Christian [id number]      Outpatient Physical Therapy  Progress Note          Progress Note covers period from:    9/25/18-11/16/18    Subjective: he is pleased and excited that he is starting to have     Objective:  Observation:  He has been using his splint but  Ost the one that fully supported his fingers and the arch of his hand. He will try to have another one fabricated next week  Test measurements:   Pt has been seen for 7.5 weeks. Triceps 3/4. Biceps 2 to 2+/5.

## 2018-11-23 ENCOUNTER — HOSPITAL ENCOUNTER (OUTPATIENT)
Dept: PHYSICAL THERAPY | Age: 33
Setting detail: THERAPIES SERIES
Discharge: HOME | End: 2018-11-23
Payer: MEDICAID

## 2018-11-23 PROCEDURE — G0283 ELEC STIM OTHER THAN WOUND: HCPCS

## 2018-11-23 PROCEDURE — 97110 THERAPEUTIC EXERCISES: CPT

## 2018-11-23 NOTE — FLOWSHEET NOTE
doing about the same. 10/10/18 EMG results in epic. Summary of EMG and Nerve Conduction Findings: The above nerve conduction studies demonstrate absent median and motor evoked responses. EMG needle exam demonstrates mild muscle membrane irritability in biceps muscle and all muscles distal to elbow. No voluntary motor units were appreciated.      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 Thank you.      10/8/18  Reports he did not sleep well at all, having EMG today. Toes are no longer numb  10/3/18  Last night had tingling post arm just inferior to elbow  10/1/18 reports not sleeping due to pain  9/25/18 states about 3 weeks ago he woke up and could not move his arm from the elbow down. He went to ED and f/u with Dr. Daryle Scott. He has had MRIs/ CT scan  and referred to PT. States he has no movement from the elbow distally. Also has numbness in his R toes since 8/31/18 but able to move them normally.     Pain    Patient describes pain to be constant  Ache/pain from his elbow to his hand. Almost contant N&T in the same area. Patient reports  3-4/10 pain at rest,  8/10 pain with movement. Worsened by  - moving around  Improved by - being more quiet and moving around less  Current Functional Limitations:  Video games, can't tie his shoes. Can't use his left hand  PLOF:   independent        Patient goal for therapy: To gain use of his left arm.         Exercises:   Exercise/Equipment Resistance/Repetitions Other comments    9/25/18 explained course and ro le of PT        Explained anatomy/nerves int he UE       AROM of the left shoulder To prevent left shoulder stiffness      Gravity eliminated elbow flx/ext. Pro/sup. Wrist flx/ext 5 at a time  X 5 x a day      PROM: elbow, forearm, wrist, hand and fingers 5 reps. 5 x day    NV- E stim.   PROM, ex       11/21/18        Bilateral finger flex/ext   2x5    Bilateral finger add/abd 1x5 as able

## 2018-11-26 ENCOUNTER — HOSPITAL ENCOUNTER (OUTPATIENT)
Dept: PHYSICAL THERAPY | Age: 33
Setting detail: THERAPIES SERIES
Discharge: HOME | End: 2018-11-26
Payer: MEDICAID

## 2018-11-26 PROCEDURE — G0283 ELEC STIM OTHER THAN WOUND: HCPCS

## 2018-11-26 PROCEDURE — 97140 MANUAL THERAPY 1/> REGIONS: CPT

## 2018-11-26 NOTE — FLOWSHEET NOTE
.Outpatient Physical Therapy     [x] Daily Treatment Note   [] Progress Note 10/22/18,   18   [] Discharge Note      Date:  2018    Patient Name:  Alejandra River        :  1985    Restrictions/Precautions:      Diagnosis:  Left arm meuropathy    Treatment Diagnosis:  same  Insurance/Certification information: [place]     Referring Physician:  Dr. Claros Pastures of care signed (Y/N):      Visit# / total visits:   17    Pain level: 4/10 currently     4-10/10 depending on the day and outside    Subjective: 18  Reports he is doing well - now can do 3-5 bicep curls in a row  18 reports he can do more still yet with arm  18 prog note done .  18 doing better with some active motion. 18  Reports he can get more range on his biceps curl and pron/sup with total gravity reduced position  18  Reports he is making improvements with the amount he is able to move and the control over the movement  10/29/18 reports he is improving with desensitization and tricep movement with gravity removed  10/26/18 imporving biceps and triceps. Able to stimulate the finger and thumb just a little now. Flexion is very limited  10/24/18 has 2 splints now. Wearing it- fits well. The one at home offers more finger support- he will use it intermit. Starting to have more triceps strength. 10/22/18  Reports the cold weather is really hurting his arm, not even liking his jacket on this morning. Reports he is not feeling any improvements at all. Reports they do not have to gas money to go to OT 2-3 x a wk. 10/19/18 doing about the same. Sensory stimulation to the left arm is irritating and painful. He continues with it.  10/17/18 has had just over 3 week of therapy. Minimal success. Continues with arm pain   10/15/18 pt reports he feels the sx are getting worse and that his hand/fingers are changing color.   Reports a part of the dorsum of his hand was so hot it was almost

## 2018-11-27 ENCOUNTER — HOSPITAL ENCOUNTER (OUTPATIENT)
Dept: PHYSICAL THERAPY | Age: 33
Setting detail: THERAPIES SERIES
Discharge: HOME | End: 2018-11-27
Payer: MEDICAID

## 2018-11-27 PROCEDURE — G0283 ELEC STIM OTHER THAN WOUND: HCPCS

## 2018-11-27 PROCEDURE — 97140 MANUAL THERAPY 1/> REGIONS: CPT

## 2018-11-27 PROCEDURE — 97110 THERAPEUTIC EXERCISES: CPT

## 2018-11-27 NOTE — FLOWSHEET NOTE
his hand/fingers are changing color. Reports a part of the dorsum of his hand was so hot it was almost sweating the other day. Does not get much rest at night due to arm   10/12/18 doing about the same. 10/10/18 EMG results in epic. Summary of EMG and Nerve Conduction Findings: The above nerve conduction studies demonstrate absent median and motor evoked responses. EMG needle exam demonstrates mild muscle membrane irritability in biceps muscle and all muscles distal to elbow. No voluntary motor units were appreciated.      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 Thank you.      10/8/18  Reports he did not sleep well at all, having EMG today. Toes are no longer numb  10/3/18  Last night had tingling post arm just inferior to elbow  10/1/18 reports not sleeping due to pain  9/25/18 states about 3 weeks ago he woke up and could not move his arm from the elbow down. He went to ED and f/u with Dr. Goran Jarrell. He has had MRIs/ CT scan  and referred to PT. States he has no movement from the elbow distally. Also has numbness in his R toes since 8/31/18 but able to move them normally.     Pain    Patient describes pain to be constant  Ache/pain from his elbow to his hand. Almost contant N&T in the same area. Patient reports  3-4/10 pain at rest,  8/10 pain with movement. Worsened by  - moving around  Improved by - being more quiet and moving around less  Current Functional Limitations:  Video games, can't tie his shoes. Can't use his left hand  PLOF:   independent        Patient goal for therapy: To gain use of his left arm.         Exercises:   Exercise/Equipment Resistance/Repetitions Other comments    9/25/18 explained course and ro le of PT        Explained anatomy/nerves int he UE       AROM of the left shoulder To prevent left shoulder stiffness      Gravity eliminated elbow flx/ext. Pro/sup.  Wrist flx/ext 5 at a time  X 5 x a day

## 2018-11-28 ENCOUNTER — APPOINTMENT (OUTPATIENT)
Dept: PHYSICAL THERAPY | Age: 33
End: 2018-11-28
Payer: MEDICAID

## 2018-11-30 ENCOUNTER — APPOINTMENT (OUTPATIENT)
Dept: PHYSICAL THERAPY | Age: 33
End: 2018-11-30
Payer: MEDICAID

## 2018-12-03 ENCOUNTER — HOSPITAL ENCOUNTER (OUTPATIENT)
Dept: PHYSICAL THERAPY | Age: 33
Setting detail: THERAPIES SERIES
Discharge: HOME | End: 2018-12-03
Payer: MEDICAID

## 2018-12-03 PROCEDURE — 97140 MANUAL THERAPY 1/> REGIONS: CPT

## 2018-12-03 PROCEDURE — G0283 ELEC STIM OTHER THAN WOUND: HCPCS

## 2018-12-04 ENCOUNTER — OFFICE VISIT (OUTPATIENT)
Dept: INTERNAL MEDICINE CLINIC | Age: 33
End: 2018-12-04

## 2018-12-04 VITALS
DIASTOLIC BLOOD PRESSURE: 96 MMHG | OXYGEN SATURATION: 98 % | SYSTOLIC BLOOD PRESSURE: 150 MMHG | HEART RATE: 100 BPM | WEIGHT: 152 LBS | BODY MASS INDEX: 23.11 KG/M2

## 2018-12-04 DIAGNOSIS — G56.32 NEUROPATHY OF LEFT RADIAL NERVE: Primary | ICD-10-CM

## 2018-12-04 DIAGNOSIS — G47.01 INSOMNIA DUE TO MEDICAL CONDITION: ICD-10-CM

## 2018-12-04 PROCEDURE — 99214 OFFICE O/P EST MOD 30 MIN: CPT | Performed by: INTERNAL MEDICINE

## 2018-12-04 RX ORDER — AMITRIPTYLINE HYDROCHLORIDE 25 MG/1
50 TABLET, FILM COATED ORAL NIGHTLY
Qty: 60 TABLET | Refills: 3 | Status: SHIPPED | OUTPATIENT
Start: 2018-12-04 | End: 2019-02-26 | Stop reason: SDUPTHER

## 2018-12-05 ENCOUNTER — APPOINTMENT (OUTPATIENT)
Dept: PHYSICAL THERAPY | Age: 33
End: 2018-12-05
Payer: MEDICAID

## 2018-12-07 ENCOUNTER — HOSPITAL ENCOUNTER (OUTPATIENT)
Dept: PHYSICAL THERAPY | Age: 33
Setting detail: THERAPIES SERIES
Discharge: HOME | End: 2018-12-07
Payer: MEDICAID

## 2018-12-07 PROCEDURE — G0283 ELEC STIM OTHER THAN WOUND: HCPCS

## 2018-12-07 PROCEDURE — 97140 MANUAL THERAPY 1/> REGIONS: CPT

## 2018-12-10 ENCOUNTER — HOSPITAL ENCOUNTER (OUTPATIENT)
Dept: PHYSICAL THERAPY | Age: 33
Setting detail: THERAPIES SERIES
Discharge: HOME | End: 2018-12-10
Payer: MEDICAID

## 2018-12-10 PROCEDURE — G0283 ELEC STIM OTHER THAN WOUND: HCPCS

## 2018-12-10 PROCEDURE — 97140 MANUAL THERAPY 1/> REGIONS: CPT

## 2018-12-10 NOTE — FLOWSHEET NOTE
.Outpatient Physical Therapy     [x] Daily Treatment Note   [x] Progress Note 10/22/18,   18, 12/3/18   [] Discharge Note      Date:  12/10/2018    Patient Name:  Ari Schroeder        :  1985    Restrictions/Precautions:      Diagnosis:  Left arm meuropathy    Treatment Diagnosis:  same  Insurance/Certification information: [place]     Referring Physician:  Dr. Sayra Brar of care signed (Y/N):      Visit# / total visits:   21  Pain level: 8/10 currently     4-10/10 depending on the day and outside    Subjective: 12/10/18  Pain higher at the moment due to the cold  18 pain at night limits his sleep. 12/3/18  Reports he can do 20 bicep curls now. Reports his use of arm has improved 25-30% but cannot do finger flexion at all. 18  Reports he was able to do 4 full bicep curls last night. Reports he did over do it a bit yesterday  18  Reports he is doing well - now can do 3-5 bicep curls in a row  18 reports he can do more still yet with arm  18 prog note done .  18 doing better with some active motion. 18  Reports he can get more range on his biceps curl and pron/sup with total gravity reduced position  18  Reports he is making improvements with the amount he is able to move and the control over the movement  10/29/18 reports he is improving with desensitization and tricep movement with gravity removed  10/26/18 imporving biceps and triceps. Able to stimulate the finger and thumb just a little now. Flexion is very limited  10/24/18 has 2 splints now. Wearing it- fits well. The one at home offers more finger support- he will use it intermit. Starting to have more triceps strength. 10/22/18  Reports the cold weather is really hurting his arm, not even liking his jacket on this morning. Reports he is not feeling any improvements at all. Reports they do not have to gas money to go to OT 2-3 x a wk. 10/19/18 doing about the same.   Sensory

## 2018-12-12 ENCOUNTER — HOSPITAL ENCOUNTER (OUTPATIENT)
Dept: PHYSICAL THERAPY | Age: 33
Setting detail: THERAPIES SERIES
Discharge: HOME | End: 2018-12-12
Payer: MEDICAID

## 2018-12-12 PROCEDURE — 97140 MANUAL THERAPY 1/> REGIONS: CPT

## 2018-12-12 PROCEDURE — G0283 ELEC STIM OTHER THAN WOUND: HCPCS

## 2018-12-12 NOTE — FLOWSHEET NOTE
.Outpatient Physical Therapy     [x] Daily Treatment Note   [x] Progress Note 10/22/18,   18, 12/3/18   [] Discharge Note      Date:  2018    Patient Name:  Yahaira Hall        :  1985    Restrictions/Precautions:      Diagnosis:  Left arm meuropathy    Treatment Diagnosis:  same  Insurance/Certification information: [place]     Referring Physician:  Dr. Arabella Murphy of care signed (Y/N):      Visit# / total visits:   22  Pain level: 8/10 currently     4-10/10 depending on the day and outside    Subjective: 18 reports he can push against light resistance into thumb abd now  12/10/18  Pain higher at the moment due to the cold  18 pain at night limits his sleep. 12/3/18  Reports he can do 20 bicep curls now. Reports his use of arm has improved 25-30% but cannot do finger flexion at all. 18  Reports he was able to do 4 full bicep curls last night. Reports he did over do it a bit yesterday  18  Reports he is doing well - now can do 3-5 bicep curls in a row  18 reports he can do more still yet with arm  18 prog note done .  18 doing better with some active motion. 18  Reports he can get more range on his biceps curl and pron/sup with total gravity reduced position  18  Reports he is making improvements with the amount he is able to move and the control over the movement  10/29/18 reports he is improving with desensitization and tricep movement with gravity removed  10/26/18 imporving biceps and triceps. Able to stimulate the finger and thumb just a little now. Flexion is very limited  10/24/18 has 2 splints now. Wearing it- fits well. The one at home offers more finger support- he will use it intermit. Starting to have more triceps strength. 10/22/18  Reports the cold weather is really hurting his arm, not even liking his jacket on this morning. Reports he is not feeling any improvements at all.   Reports they do not have to

## 2018-12-14 ENCOUNTER — HOSPITAL ENCOUNTER (OUTPATIENT)
Dept: PHYSICAL THERAPY | Age: 33
Setting detail: THERAPIES SERIES
Discharge: HOME | End: 2018-12-14
Payer: MEDICAID

## 2018-12-14 PROCEDURE — 97032 APPL MODALITY 1+ESTIM EA 15: CPT

## 2018-12-14 PROCEDURE — 97140 MANUAL THERAPY 1/> REGIONS: CPT

## 2018-12-17 ENCOUNTER — HOSPITAL ENCOUNTER (OUTPATIENT)
Dept: PHYSICAL THERAPY | Age: 33
Setting detail: THERAPIES SERIES
Discharge: HOME | End: 2018-12-17
Payer: MEDICAID

## 2018-12-17 PROCEDURE — 97150 GROUP THERAPEUTIC PROCEDURES: CPT

## 2018-12-17 PROCEDURE — G0283 ELEC STIM OTHER THAN WOUND: HCPCS

## 2018-12-17 PROCEDURE — 97110 THERAPEUTIC EXERCISES: CPT

## 2018-12-17 PROCEDURE — 97140 MANUAL THERAPY 1/> REGIONS: CPT

## 2018-12-17 NOTE — FLOWSHEET NOTE
66    1 man,    1 grp ex  1 E- stim       Medicare Cap Total YTD:      Treatment/Activity Tolerance:    [x] Patient tolerated treatment well [] Patient limited by fatigue   [] Patient limited by pain [] Patient limited by other medical complications   [] Other:     Prognosis: [x] Good [x] Fair  [] Poor    Patient Requires Follow-up:  [x] Yes  [] No    Plan: [x] Continue per plan of care [] Alter current plan (see comments)   [] Plan of care initiated [] Hold pending MD visit [] Discharge    Plan for Next Session:  E stim, PROM, ex     See Progress Note: [] Yes  [x] No       Next due:    1/2/18     Electronically signed by:  Caro Galeazzi, [id number]                Progress Note covers period from:    11/16/18-12/3/18        Objective:  Observation:  He has been using his splint   Test measurements:     Triceps 3+/4. Biceps 3/5. Wrist ext 3+/5, Wrist flex, fingers, thumb 1+ to 2-. GCODEs and Functional Outcome Measure:            Assessment:  Summary:  Seen x 19 visits, improved mobility of UE with the exception of wrist and finger flex and 5th finger  Patient's response to treatment: pain with treatment     Goals  GCode:    Short Term Goals ( 4   weeks) Long Term Goals ( 8  weeks)   1). Establish HEP 1). (I) HEP   2). promote muscle re ed with e stim 2). strength to 4/5   3). strength to 3/4 3). improve use 75%   4). improve use 25% 4). improve  and pinches   5).  5).   6). 6).        · Progress toward previous goals: STGs 1,2,4 met     Plan: cont for 1 more month  ·     Electronically Signed by:  Akbar Hahn [id number]/Aditi Oliver [id number]

## 2018-12-19 ENCOUNTER — HOSPITAL ENCOUNTER (OUTPATIENT)
Dept: PHYSICAL THERAPY | Age: 33
Setting detail: THERAPIES SERIES
Discharge: HOME | End: 2018-12-19
Payer: MEDICAID

## 2018-12-19 PROCEDURE — 97140 MANUAL THERAPY 1/> REGIONS: CPT

## 2018-12-19 PROCEDURE — 97150 GROUP THERAPEUTIC PROCEDURES: CPT

## 2018-12-19 PROCEDURE — G0283 ELEC STIM OTHER THAN WOUND: HCPCS

## 2018-12-19 PROCEDURE — 97112 NEUROMUSCULAR REEDUCATION: CPT

## 2018-12-24 ENCOUNTER — HOSPITAL ENCOUNTER (OUTPATIENT)
Dept: PHYSICAL THERAPY | Age: 33
Setting detail: THERAPIES SERIES
Discharge: HOME | End: 2018-12-24
Payer: MEDICAID

## 2018-12-24 PROCEDURE — 97140 MANUAL THERAPY 1/> REGIONS: CPT

## 2018-12-24 PROCEDURE — G0283 ELEC STIM OTHER THAN WOUND: HCPCS

## 2018-12-24 PROCEDURE — 97112 NEUROMUSCULAR REEDUCATION: CPT

## 2018-12-24 PROCEDURE — 97150 GROUP THERAPEUTIC PROCEDURES: CPT

## 2018-12-24 NOTE — PROGRESS NOTES
.Outpatient Physical Therapy     [x] Daily Treatment Note   [] Progress Note 10/22/18,   18, 12/3/18 , 18  [] Discharge Note      Date:  2018    Patient Name:  Kay Jacobs        :  1985    Restrictions/Precautions:      Diagnosis:  Left arm meuropathy    Treatment Diagnosis:  same  Insurance/Certification information: [place]     Referring Physician:  Dr. Rajan of care signed (Y/N):      Visit# / total visits:   26    Pain level: 4/10 currently     4-10/10 depending on the day and outside    Subjective: 18  Reports tenderness along ulnar bone if tapped or hit sending pain down the length of the bone  18 doing OK. Working on finger flexion ROM, strenght of elbow,wrist, fingers. 18   No new developments   18 no new developments reported  18 pt reports no new changes  18 reports he can push against light resistance into thumb abd now  12/10/18  Pain higher at the moment due to the cold  18 pain at night limits his sleep. 12/3/18  Reports he can do 20 bicep curls now. Reports his use of arm has improved 25-30% but cannot do finger flexion at all. 18  Reports he was able to do 4 full bicep curls last night. Reports he did over do it a bit yesterday  18  Reports he is doing well - now can do 3-5 bicep curls in a row  18 reports he can do more still yet with arm  18 prog note done .  18 doing better with some active motion. 18  Reports he can get more range on his biceps curl and pron/sup with total gravity reduced position  18  Reports he is making improvements with the amount he is able to move and the control over the movement  10/29/18 reports he is improving with desensitization and tricep movement with gravity removed  10/26/18 imporving biceps and triceps. Able to stimulate the finger and thumb just a little now. Flexion is very limited  10/24/18 has 2 splints now.   Wearing it-

## 2018-12-26 ENCOUNTER — HOSPITAL ENCOUNTER (OUTPATIENT)
Dept: PHYSICAL THERAPY | Age: 33
Setting detail: THERAPIES SERIES
Discharge: HOME | End: 2018-12-26
Payer: MEDICAID

## 2018-12-26 PROCEDURE — 97110 THERAPEUTIC EXERCISES: CPT

## 2018-12-26 PROCEDURE — 97112 NEUROMUSCULAR REEDUCATION: CPT

## 2018-12-26 PROCEDURE — G0283 ELEC STIM OTHER THAN WOUND: HCPCS

## 2018-12-26 PROCEDURE — 97140 MANUAL THERAPY 1/> REGIONS: CPT

## 2018-12-26 NOTE — PROGRESS NOTES
.Outpatient Physical Therapy     [x] Daily Treatment Note   [] Progress Note 10/22/18,   18, 12/3/18 , 18  [] Discharge Note      Date:  2018    Patient Name:  Lindsay Connor        :  1985    Restrictions/Precautions:      Diagnosis:  Left arm meuropathy    Treatment Diagnosis:  same  Insurance/Certification information: [place]     Referring Physician:  Dr. Rosalie Walls of care signed (Y/N):      Visit# / total visits:   27    Pain level: 5/10 currently     4-10/10 depending on the day and outside    Subjective: 18 reports morning and cold are the worst with pain scale,  Reports hand does not work as well when he first gets up  18  Reports tenderness along ulnar bone if tapped or hit sending pain down the length of the bone  18 doing OK. Working on finger flexion ROM, strenght of elbow,wrist, fingers. 18   No new developments   18 no new developments reported  18 pt reports no new changes  18 reports he can push against light resistance into thumb abd now  12/10/18  Pain higher at the moment due to the cold  18 pain at night limits his sleep. 12/3/18  Reports he can do 20 bicep curls now. Reports his use of arm has improved 25-30% but cannot do finger flexion at all. 18  Reports he was able to do 4 full bicep curls last night. Reports he did over do it a bit yesterday  18  Reports he is doing well - now can do 3-5 bicep curls in a row  18 reports he can do more still yet with arm  18 prog note done .  18 doing better with some active motion. 18  Reports he can get more range on his biceps curl and pron/sup with total gravity reduced position  18  Reports he is making improvements with the amount he is able to move and the control over the movement  10/29/18 reports he is improving with desensitization and tricep movement with gravity removed  10/26/18 imporving biceps and triceps. Some in conjunction with re- ed and PROM, stretching to thumb abd and ext. desensitization. Finding m tor points for E stim. MMT    Modalities:   45 min   15 min at a time to wrist flx, 5th finger, finger flex  Attended E- stim for re education     Timed Code Treatment Minutes:    45    Total Treatment Minutes:   75   1man,  1 nm,   1grp ex,  1 E- stim       Medicare Cap Total YTD:      Treatment/Activity Tolerance:    [x] Patient tolerated treatment well [] Patient limited by fatigue   [] Patient limited by pain [] Patient limited by other medical complications   [] Other:     Prognosis: [x] Good [x] Fair  [] Poor    Patient Requires Follow-up:  [x] Yes  [] No    Plan: [x] Continue per plan of care [] Alter current plan (see comments)   [] Plan of care initiated [] Hold pending MD visit [] Discharge    Plan for Next Session:  E stim, PROM, ex     See Progress Note: [] Yes  [x] No       Next due:     1/20/18     Electronically signed by:  Danielle Eaton, [id number]      Outpatient Physical Therapy  Progress Note          Progress Note covers period from:    11/16/18-12/3/18- 12/17/18        Objective:  Observation:  He has been using his splint (intermit)  Test measurements:     Triceps 4-/4. Biceps 4-/5. Wrist ext 3+/5,   finger ext:3+/5     Thumb ext:  3/4. Wrist flex  1+/5 ,  fingers,  thumb 1+        GCODEs and Functional Outcome Measure:            Assessment:  Summary:  Seen x   25 visits, improved mobility of UE  . Wrist flexion and finger flexion the most limited. Making steady progress. Able to use  2# at the elbow now. Patient's response to treatment:  Some pain with treatment     Goals  GCode:    Short Term Goals ( 4   weeks) Long Term Goals ( 8  weeks)   1). Establish HEP 1). (I) HEP   2). promote muscle re ed with e stim 2). strength to 4/5   3). strength to 3/4 3). improve use 75%   4). improve use 25% 4). improve  and pinches   5).  5).   6). 6).        · Progress toward previous goals:

## 2018-12-28 ENCOUNTER — HOSPITAL ENCOUNTER (OUTPATIENT)
Dept: PHYSICAL THERAPY | Age: 33
Setting detail: THERAPIES SERIES
Discharge: HOME | End: 2018-12-28
Payer: MEDICAID

## 2018-12-28 PROCEDURE — 97110 THERAPEUTIC EXERCISES: CPT

## 2018-12-28 PROCEDURE — 97140 MANUAL THERAPY 1/> REGIONS: CPT

## 2018-12-28 PROCEDURE — G0283 ELEC STIM OTHER THAN WOUND: HCPCS

## 2018-12-28 NOTE — FLOWSHEET NOTE
Gait:      Manual Therapy:      15 min  R hand mobiliz to fingers to achieve full flx of MP,PIP,DIP jts. Some in conjunction with re- ed and PROM, stretching to thumb abd and ext. desensitization. Finding m tor points for E stim. MMT    Modalities:    30  min   15 min at a time to wrist flx, 5th finger, finger flex    E- stim for re education     Timed Code Treatment Minutes:     45    Total Treatment Minutes:   75   1man,      1  ex,  1 E- stim       Medicare Cap Total YTD:      Treatment/Activity Tolerance:    [x] Patient tolerated treatment well [] Patient limited by fatigue   [] Patient limited by pain [] Patient limited by other medical complications   [] Other:     Prognosis: [x] Good [x] Fair  [] Poor    Patient Requires Follow-up:  [x] Yes  [] No    Plan: [x] Continue per plan of care [] Alter current plan (see comments)   [] Plan of care initiated [] Hold pending MD visit [] Discharge    Plan for Next Session:  E stim, jt mobiliz for fingers. , PRE's for shoulder, elbow, wrist, hand as leola. See Progress Note: [] Yes  [x] No       Next due:     1/20/18     Electronically signed by:  Syeda Espitia, [id number]                Progress Note covers period from:    11/16/18-12/3/18- 12/17/18        Objective:  Observation:  He has been using his splint (intermit)  Test measurements:     Triceps 4-/4. Biceps 4-/5. Wrist ext 3+/5,   finger ext:3+/5     Thumb ext:  3/4. Wrist flex  1+/5 ,  fingers,  thumb 1+        GCODEs and Functional Outcome Measure:            Assessment:  Summary:  Seen x   25 visits, improved mobility of UE  . Wrist flexion and finger flexion the most limited. Making steady progress. Able to use  2# at the elbow now. Patient's response to treatment:  Some pain with treatment     Goals  GCode:    Short Term Goals ( 4   weeks) Long Term Goals ( 8  weeks)   1). Establish HEP 1). (I) HEP   2). promote muscle re ed with e stim 2). strength to 4/5   3). strength to 3/4 3). improve use

## 2019-01-02 ENCOUNTER — HOSPITAL ENCOUNTER (OUTPATIENT)
Dept: PHYSICAL THERAPY | Age: 34
Setting detail: THERAPIES SERIES
Discharge: HOME | End: 2019-01-02
Payer: MEDICAID

## 2019-01-02 PROCEDURE — 97110 THERAPEUTIC EXERCISES: CPT

## 2019-01-02 PROCEDURE — 97140 MANUAL THERAPY 1/> REGIONS: CPT

## 2019-01-02 PROCEDURE — G0283 ELEC STIM OTHER THAN WOUND: HCPCS

## 2019-01-02 PROCEDURE — 97112 NEUROMUSCULAR REEDUCATION: CPT

## 2019-01-07 ENCOUNTER — HOSPITAL ENCOUNTER (OUTPATIENT)
Dept: PHYSICAL THERAPY | Age: 34
Setting detail: THERAPIES SERIES
Discharge: HOME | End: 2019-01-07
Payer: MEDICAID

## 2019-01-07 PROCEDURE — G0283 ELEC STIM OTHER THAN WOUND: HCPCS

## 2019-01-07 PROCEDURE — 97112 NEUROMUSCULAR REEDUCATION: CPT

## 2019-01-07 PROCEDURE — 97110 THERAPEUTIC EXERCISES: CPT

## 2019-01-07 PROCEDURE — 97140 MANUAL THERAPY 1/> REGIONS: CPT

## 2019-01-09 ENCOUNTER — HOSPITAL ENCOUNTER (OUTPATIENT)
Dept: PHYSICAL THERAPY | Age: 34
Setting detail: THERAPIES SERIES
Discharge: HOME | End: 2019-01-09
Payer: MEDICAID

## 2019-01-09 PROCEDURE — G0283 ELEC STIM OTHER THAN WOUND: HCPCS

## 2019-01-09 PROCEDURE — 97110 THERAPEUTIC EXERCISES: CPT

## 2019-01-09 PROCEDURE — 97140 MANUAL THERAPY 1/> REGIONS: CPT

## 2019-01-09 PROCEDURE — 97112 NEUROMUSCULAR REEDUCATION: CPT

## 2019-01-14 ENCOUNTER — APPOINTMENT (OUTPATIENT)
Dept: PHYSICAL THERAPY | Age: 34
End: 2019-01-14
Payer: MEDICAID

## 2019-01-16 ENCOUNTER — HOSPITAL ENCOUNTER (OUTPATIENT)
Dept: PHYSICAL THERAPY | Age: 34
Setting detail: THERAPIES SERIES
Discharge: HOME | End: 2019-01-16
Payer: MEDICAID

## 2019-01-16 PROCEDURE — 97110 THERAPEUTIC EXERCISES: CPT

## 2019-01-16 PROCEDURE — 97032 APPL MODALITY 1+ESTIM EA 15: CPT

## 2019-01-16 PROCEDURE — 97112 NEUROMUSCULAR REEDUCATION: CPT

## 2019-01-21 ENCOUNTER — APPOINTMENT (OUTPATIENT)
Dept: PHYSICAL THERAPY | Age: 34
End: 2019-01-21
Payer: MEDICAID

## 2019-01-22 ENCOUNTER — HOSPITAL ENCOUNTER (OUTPATIENT)
Dept: PHYSICAL THERAPY | Age: 34
Setting detail: THERAPIES SERIES
Discharge: HOME | End: 2019-01-22
Payer: MEDICAID

## 2019-01-22 PROCEDURE — G0283 ELEC STIM OTHER THAN WOUND: HCPCS

## 2019-01-22 PROCEDURE — 97110 THERAPEUTIC EXERCISES: CPT

## 2019-01-22 PROCEDURE — 97140 MANUAL THERAPY 1/> REGIONS: CPT

## 2019-01-25 ENCOUNTER — HOSPITAL ENCOUNTER (OUTPATIENT)
Dept: PHYSICAL THERAPY | Age: 34
Setting detail: THERAPIES SERIES
Discharge: HOME | End: 2019-01-25
Payer: MEDICAID

## 2019-01-25 PROCEDURE — 97112 NEUROMUSCULAR REEDUCATION: CPT

## 2019-01-25 PROCEDURE — 97110 THERAPEUTIC EXERCISES: CPT

## 2019-01-25 PROCEDURE — 97032 APPL MODALITY 1+ESTIM EA 15: CPT

## 2019-01-29 ENCOUNTER — HOSPITAL ENCOUNTER (OUTPATIENT)
Dept: PHYSICAL THERAPY | Age: 34
Setting detail: THERAPIES SERIES
Discharge: HOME | End: 2019-01-29
Payer: MEDICAID

## 2019-01-29 PROCEDURE — G0283 ELEC STIM OTHER THAN WOUND: HCPCS

## 2019-01-29 PROCEDURE — 97112 NEUROMUSCULAR REEDUCATION: CPT

## 2019-01-29 PROCEDURE — 97140 MANUAL THERAPY 1/> REGIONS: CPT

## 2019-02-01 ENCOUNTER — HOSPITAL ENCOUNTER (OUTPATIENT)
Dept: PHYSICAL THERAPY | Age: 34
Setting detail: THERAPIES SERIES
Discharge: HOME | End: 2019-02-01
Payer: MEDICAID

## 2019-02-01 PROCEDURE — G8986 CARRY D/C STATUS: HCPCS

## 2019-02-01 PROCEDURE — G8984 CARRY CURRENT STATUS: HCPCS

## 2019-02-01 PROCEDURE — 97140 MANUAL THERAPY 1/> REGIONS: CPT

## 2019-02-01 PROCEDURE — 97110 THERAPEUTIC EXERCISES: CPT

## 2019-02-01 PROCEDURE — G0283 ELEC STIM OTHER THAN WOUND: HCPCS

## 2019-02-01 PROCEDURE — G8985 CARRY GOAL STATUS: HCPCS

## 2019-02-05 ENCOUNTER — OFFICE VISIT (OUTPATIENT)
Dept: INTERNAL MEDICINE CLINIC | Age: 34
End: 2019-02-05

## 2019-02-05 VITALS
OXYGEN SATURATION: 99 % | HEART RATE: 108 BPM | SYSTOLIC BLOOD PRESSURE: 129 MMHG | DIASTOLIC BLOOD PRESSURE: 81 MMHG | WEIGHT: 165 LBS | BODY MASS INDEX: 25.09 KG/M2

## 2019-02-05 DIAGNOSIS — G56.32 NEUROPATHY OF LEFT RADIAL NERVE: Primary | ICD-10-CM

## 2019-02-05 DIAGNOSIS — F34.1 DYSTHYMIA: ICD-10-CM

## 2019-02-05 DIAGNOSIS — G47.01 INSOMNIA DUE TO MEDICAL CONDITION: ICD-10-CM

## 2019-02-05 PROCEDURE — 99214 OFFICE O/P EST MOD 30 MIN: CPT | Performed by: INTERNAL MEDICINE

## 2019-02-05 RX ORDER — CITALOPRAM 20 MG/1
20 TABLET ORAL DAILY
Qty: 30 TABLET | Refills: 5 | Status: SHIPPED | OUTPATIENT
Start: 2019-02-05 | End: 2019-02-26

## 2019-02-26 ENCOUNTER — HOSPITAL ENCOUNTER (OUTPATIENT)
Age: 34
Discharge: HOME | End: 2019-02-26
Payer: MEDICAID

## 2019-02-26 ENCOUNTER — OFFICE VISIT (OUTPATIENT)
Dept: INTERNAL MEDICINE CLINIC | Age: 34
End: 2019-02-26

## 2019-02-26 VITALS
SYSTOLIC BLOOD PRESSURE: 129 MMHG | OXYGEN SATURATION: 100 % | WEIGHT: 162 LBS | HEART RATE: 103 BPM | BODY MASS INDEX: 24.63 KG/M2 | DIASTOLIC BLOOD PRESSURE: 88 MMHG

## 2019-02-26 DIAGNOSIS — G56.32 NEUROPATHY OF LEFT RADIAL NERVE: ICD-10-CM

## 2019-02-26 DIAGNOSIS — D50.9 MICROCYTIC ANEMIA: ICD-10-CM

## 2019-02-26 DIAGNOSIS — D50.9 MICROCYTIC ANEMIA: Primary | ICD-10-CM

## 2019-02-26 DIAGNOSIS — G25.81 RESTLESS LEG SYNDROME: ICD-10-CM

## 2019-02-26 DIAGNOSIS — G47.01 INSOMNIA DUE TO MEDICAL CONDITION: ICD-10-CM

## 2019-02-26 LAB
FERRITIN: 295.6 NG/ML (ref 30–400)
HCT VFR BLD CALC: 47 % (ref 40.5–52.5)
HEMOGLOBIN: 15.1 G/DL (ref 13.5–17.5)
IRON SATURATION: 15 % (ref 20–50)
IRON: 49 UG/DL (ref 59–158)
MCH RBC QN AUTO: 25.4 PG (ref 26–34)
MCHC RBC AUTO-ENTMCNC: 32.1 G/DL (ref 31–36)
MCV RBC AUTO: 79 FL (ref 80–100)
PDW BLD-RTO: 14.7 % (ref 12.4–15.4)
PLATELET # BLD: 262 K/UL (ref 135–450)
PMV BLD AUTO: 8.4 FL (ref 5–10.5)
RBC # BLD: 5.95 M/UL (ref 4.2–5.9)
TOTAL IRON BINDING CAPACITY: 331 UG/DL (ref 260–445)
WBC # BLD: 12.5 K/UL (ref 4–11)

## 2019-02-26 PROCEDURE — 85027 COMPLETE CBC AUTOMATED: CPT

## 2019-02-26 PROCEDURE — 99214 OFFICE O/P EST MOD 30 MIN: CPT | Performed by: INTERNAL MEDICINE

## 2019-02-26 PROCEDURE — 36415 COLL VENOUS BLD VENIPUNCTURE: CPT

## 2019-02-26 PROCEDURE — 83550 IRON BINDING TEST: CPT

## 2019-02-26 PROCEDURE — 82728 ASSAY OF FERRITIN: CPT

## 2019-02-26 PROCEDURE — 83540 ASSAY OF IRON: CPT

## 2019-02-26 RX ORDER — QUETIAPINE FUMARATE 25 MG/1
25 TABLET, FILM COATED ORAL NIGHTLY
Qty: 30 TABLET | Refills: 1 | Status: SHIPPED | OUTPATIENT
Start: 2019-02-26 | End: 2019-04-09 | Stop reason: SINTOL

## 2019-02-26 RX ORDER — AMITRIPTYLINE HYDROCHLORIDE 25 MG/1
50 TABLET, FILM COATED ORAL NIGHTLY
Qty: 60 TABLET | Refills: 3 | Status: SHIPPED | OUTPATIENT
Start: 2019-02-26 | End: 2019-06-18 | Stop reason: SDUPTHER

## 2019-03-19 ENCOUNTER — OFFICE VISIT (OUTPATIENT)
Dept: INTERNAL MEDICINE CLINIC | Age: 34
End: 2019-03-19

## 2019-03-19 VITALS
BODY MASS INDEX: 24.48 KG/M2 | WEIGHT: 156 LBS | OXYGEN SATURATION: 99 % | DIASTOLIC BLOOD PRESSURE: 79 MMHG | SYSTOLIC BLOOD PRESSURE: 117 MMHG | HEIGHT: 67 IN | HEART RATE: 86 BPM

## 2019-03-19 DIAGNOSIS — D50.9 IRON DEFICIENCY ANEMIA, UNSPECIFIED IRON DEFICIENCY ANEMIA TYPE: ICD-10-CM

## 2019-03-19 DIAGNOSIS — F17.200 SMOKER: ICD-10-CM

## 2019-03-19 DIAGNOSIS — G56.32 NEUROPATHY OF LEFT RADIAL NERVE: Primary | ICD-10-CM

## 2019-03-19 DIAGNOSIS — G25.81 RESTLESS LEG SYNDROME: ICD-10-CM

## 2019-03-19 DIAGNOSIS — D50.9 MICROCYTIC ANEMIA: ICD-10-CM

## 2019-03-19 DIAGNOSIS — G47.01 INSOMNIA DUE TO MEDICAL CONDITION: ICD-10-CM

## 2019-03-19 PROCEDURE — 99214 OFFICE O/P EST MOD 30 MIN: CPT | Performed by: INTERNAL MEDICINE

## 2019-03-19 RX ORDER — FERROUS SULFATE 325(65) MG
325 TABLET ORAL
Qty: 30 TABLET | Refills: 5 | Status: SHIPPED | OUTPATIENT
Start: 2019-03-19 | End: 2020-02-19

## 2019-03-19 RX ORDER — TRAZODONE HYDROCHLORIDE 50 MG/1
50 TABLET ORAL NIGHTLY PRN
Qty: 30 TABLET | Refills: 5 | Status: SHIPPED | OUTPATIENT
Start: 2019-03-19 | End: 2019-07-02 | Stop reason: SDUPTHER

## 2019-04-09 ENCOUNTER — OFFICE VISIT (OUTPATIENT)
Dept: INTERNAL MEDICINE CLINIC | Age: 34
End: 2019-04-09

## 2019-04-09 VITALS
DIASTOLIC BLOOD PRESSURE: 86 MMHG | WEIGHT: 157 LBS | SYSTOLIC BLOOD PRESSURE: 138 MMHG | BODY MASS INDEX: 24.59 KG/M2 | HEART RATE: 82 BPM | OXYGEN SATURATION: 100 %

## 2019-04-09 DIAGNOSIS — G56.32 RADIAL NERVE PALSY, LEFT: Primary | ICD-10-CM

## 2019-04-09 PROCEDURE — 99212 OFFICE O/P EST SF 10 MIN: CPT | Performed by: INTERNAL MEDICINE

## 2019-04-09 NOTE — PROGRESS NOTES
SUBJECTIVE:    Chief Complaint   Patient presents with    Follow-up     3 week follow up RLS, radial nerve palsy    Peripheral Neuropathy     left radial nerve palsy    Insomnia    Nicotine Dependence     Patient is here for a 3 week follow-up  He was resumed on Seroquel for depression. Patient states that he is quit taking it due to significant side effects. Complaint of chest pain anxiety and panic attacks. He is doing well on Elavil 25-50 mg nightly, and also takes trazodone nightly as needed for sleep-pretty much end up taking it every other day. Mood is good today. Denies any complaints. Left hand radial nerve palsy, status post therapy, sensation and strength is coming back. MEDICATIONS:  Current Outpatient Medications   Medication Sig Dispense Refill    traZODone (DESYREL) 50 MG tablet Take 1 tablet by mouth nightly as needed for Sleep 30 tablet 5    ferrous sulfate 325 (65 Fe) MG tablet Take 1 tablet by mouth daily (with breakfast) 30 tablet 5    amitriptyline (ELAVIL) 25 MG tablet Take 2 tablets by mouth nightly 60 tablet 3     No current facility-administered medications for this visit. No results for input(s): WBC, HGB, PLT in the last 72 hours. No results for input(s): NA, K, CL, CO2, BUN, CREATININE, GLUCOSE in the last 72 hours. No results for input(s): AST, ALT, ALB, BILITOT, ALKPHOS in the last 72 hours.     No results found for: LABA1C  Lab Results   Component Value Date    WBC 12.5 (H) 02/26/2019    HGB 15.1 02/26/2019     02/26/2019     Lab Results   Component Value Date     08/31/2018    K 4.5 08/31/2018     08/31/2018    CO2 27 08/31/2018    BUN 21 (H) 08/31/2018    CREATININE 0.8 (L) 08/31/2018    GLUCOSE 103 (H) 08/31/2018       OBJECTIVE:    /86   Pulse 82   Wt 157 lb (71.2 kg)   SpO2 100%   BMI 24.59 kg/m²   HEENT:  Oropharynx clear, no lymphadenopathy,   LUNGS:  Clear and without wheezes  HEART:  Regular rhythm, no appreciable murmur  ABD:  Benign, active bowel sounds  EXT:  No edema, pulses palpable  NEURO:  No focal neurologic deficits noted. Left hand decreased motor strength,  cannot extend left lateral 2 fingers completely at. ASSESSMENT / PLAN:   1. Left radial nerve palsy:  Continues to improves. Continue with therapy. 2. Difficulty sleeping due to neuropathy:  Continue amitriptyline (Elavil) 25 to 50 mg nightly.   3. Insomnia - Difficulty with sleep induction (falling asleep) and sleep maintenance (staying asleep):   Takes trazodone 50 mg tablet - ordered as nightly as needed and has been taking it every other night and it helps . 4. Restless leg syndrome with mild iron deficiency:   Take ferrous sulfate (iron tablet) 325 mg tablet every other day with 500 mg vitamin C and food.     5. Smoking:  Continue to cut back on cigarettes until you are not smoking. 6. Depression-he has tried Cymbalta and Seroquel in the past.  And has quit taking it due to significant side effects. He feels well now.   Continue Elavil and trazodone as above.           Follow-up in clinic in 2 months       Adalberto Duarte MD  4/9/2019

## 2019-06-18 ENCOUNTER — OFFICE VISIT (OUTPATIENT)
Dept: INTERNAL MEDICINE CLINIC | Age: 34
End: 2019-06-18

## 2019-06-18 VITALS
SYSTOLIC BLOOD PRESSURE: 139 MMHG | HEIGHT: 66 IN | BODY MASS INDEX: 24.43 KG/M2 | HEART RATE: 88 BPM | WEIGHT: 152 LBS | DIASTOLIC BLOOD PRESSURE: 90 MMHG | OXYGEN SATURATION: 100 %

## 2019-06-18 DIAGNOSIS — G25.81 RESTLESS LEG SYNDROME: ICD-10-CM

## 2019-06-18 DIAGNOSIS — D50.9 IRON DEFICIENCY ANEMIA, UNSPECIFIED IRON DEFICIENCY ANEMIA TYPE: ICD-10-CM

## 2019-06-18 DIAGNOSIS — G56.32 RADIAL NERVE PALSY, LEFT: ICD-10-CM

## 2019-06-18 DIAGNOSIS — G47.01 INSOMNIA DUE TO MEDICAL CONDITION: ICD-10-CM

## 2019-06-18 DIAGNOSIS — F41.8 ANXIOUS DEPRESSION: Primary | ICD-10-CM

## 2019-06-18 PROCEDURE — 96160 PT-FOCUSED HLTH RISK ASSMT: CPT | Performed by: INTERNAL MEDICINE

## 2019-06-18 PROCEDURE — 99214 OFFICE O/P EST MOD 30 MIN: CPT | Performed by: INTERNAL MEDICINE

## 2019-06-18 RX ORDER — VENLAFAXINE HYDROCHLORIDE 75 MG/1
75 CAPSULE, EXTENDED RELEASE ORAL DAILY
Qty: 30 CAPSULE | Refills: 1 | Status: SHIPPED | OUTPATIENT
Start: 2019-06-18 | End: 2019-07-02

## 2019-06-18 RX ORDER — AMITRIPTYLINE HYDROCHLORIDE 25 MG/1
50 TABLET, FILM COATED ORAL NIGHTLY
Qty: 60 TABLET | Refills: 3 | Status: SHIPPED | OUTPATIENT
Start: 2019-06-18 | End: 2020-02-19 | Stop reason: ALTCHOICE

## 2019-06-18 ASSESSMENT — PATIENT HEALTH QUESTIONNAIRE - PHQ9
8. MOVING OR SPEAKING SO SLOWLY THAT OTHER PEOPLE COULD HAVE NOTICED. OR THE OPPOSITE, BEING SO FIGETY OR RESTLESS THAT YOU HAVE BEEN MOVING AROUND A LOT MORE THAN USUAL: 2
9. THOUGHTS THAT YOU WOULD BE BETTER OFF DEAD, OR OF HURTING YOURSELF: 2
2. FEELING DOWN, DEPRESSED OR HOPELESS: 2
SUM OF ALL RESPONSES TO PHQ9 QUESTIONS 1 & 2: 3
DEPRESSION UNABLE TO ASSESS: FUNCTIONAL CAPACITY MOTIVATION LIMITS ACCURACY
1. LITTLE INTEREST OR PLEASURE IN DOING THINGS: 1
3. TROUBLE FALLING OR STAYING ASLEEP: 3
7. TROUBLE CONCENTRATING ON THINGS, SUCH AS READING THE NEWSPAPER OR WATCHING TELEVISION: 1
10. IF YOU CHECKED OFF ANY PROBLEMS, HOW DIFFICULT HAVE THESE PROBLEMS MADE IT FOR YOU TO DO YOUR WORK, TAKE CARE OF THINGS AT HOME, OR GET ALONG WITH OTHER PEOPLE: 3
5. POOR APPETITE OR OVEREATING: 3
SUM OF ALL RESPONSES TO PHQ QUESTIONS 1-9: 18
SUM OF ALL RESPONSES TO PHQ QUESTIONS 1-9: 18
4. FEELING TIRED OR HAVING LITTLE ENERGY: 3
6. FEELING BAD ABOUT YOURSELF - OR THAT YOU ARE A FAILURE OR HAVE LET YOURSELF OR YOUR FAMILY DOWN: 1

## 2019-06-18 NOTE — PATIENT INSTRUCTIONS
1. Left radial nerve palsy:  Continue with therapy. 2. Difficulty sleeping due to neuropathy:  START back on amitriptyline (Elavil) 25 mg nightly.   3. Insomnia - Difficulty with sleep induction (falling asleep) and sleep maintenance (staying asleep):   Take trazodone 50 mg tablet EVERY night. .  4. Restless leg syndrome with mild iron deficiency:   Continue to take ferrous sulfate (iron tablet) 325 mg tablet every other day with 500 mg vitamin C and food for another three months. 5. Smoking:  Continue to cut back on cigarettes until you are not smoking. 6. Anxious depression:   Refer to behavioral health for outpatient. START taking venlafaxine (Effexor) 75 mg once per day in the morning for a week, then 150 mg every morning.         Follow-up next week

## 2019-06-18 NOTE — PROGRESS NOTES
SUBJECTIVE:  Follow up from 4/9 for depression. He was intended to start on Seroquel. Continues on trazodone 50 mg at night but only taking every other night to avoid dependence. He is taking amitriptyline (Elavil) 25 mg at bedtime for neuropathy. Tooth pulled last week but has not yet started taking the antibiotic. Left arm radial nerve palsy about the same, started August 31. Less ice pica since he started taking ferrous sulfate but also due to tooth pain. MEDICATIONS:  traZODone (DESYREL) 50 MG tablet Taking 2 tablets by mouth every other night   ferrous sulfate 325 (65 Fe) MG tablet Take 1 tablet by mouth every other day   amitriptyline (ELAVIL) 25 MG tablet Take 1 tablets by mouth nightly       Lab Results   Component Value Date    WBC 12.5 (H) 02/26/2019    HGB 15.1 02/26/2019     02/26/2019    MCV 79.0 (L) 02/26/2019     Lab Results   Component Value Date     08/31/2018    K 4.5 08/31/2018     08/31/2018    CO2 27 08/31/2018    BUN 21 (H) 08/31/2018    CREATININE 0.8 (L) 08/31/2018    GLUCOSE 103 (H) 08/31/2018     Ferritin: 295 ng/mL  Fe / TIBC 49 / 331 = 15% saturation    OBJECTIVE:    BP (!) 139/90 (Site: Left Upper Arm, Position: Sitting, Cuff Size: Medium Adult)   Pulse 88   Ht 5' 6\" (1.676 m)   Wt 152 lb (68.9 kg)   SpO2 100%   BMI 24.53 kg/m²   HEENT:  Oropharynx clear, no lymphadenopathy,   LUNGS:  Clear and without wheezes  HEART:  Regular rhythm, no appreciable murmur  ABD:  Benign, active bowel sounds  EXT:  No edema, pulses palpable  NEURO:  No focal neurologic deficits noted. ASSESSMENT / PLAN:   1. Left radial nerve palsy:  Continue with therapy. 2. Difficulty sleeping due to neuropathy:  START back on amitriptyline (Elavil) 25 mg nightly.   3. Insomnia - Difficulty with sleep induction (falling asleep) and sleep maintenance (staying asleep):   Take trazodone 50 mg tablet EVERY night. .  4.  Restless leg syndrome with mild iron deficiency:   Continue to take ferrous sulfate (iron tablet) 325 mg tablet every other day with 500 mg vitamin C and food for another three months. 5. Smoking:  Continue to cut back on cigarettes until you are not smoking. 6. Anxious depression:   Refer to behavioral health for outpatient. START taking venlafaxine (Effexor) 75 mg once per day in the morning for a week, then 150 mg every morning.         Follow-up next week

## 2019-07-02 ENCOUNTER — OFFICE VISIT (OUTPATIENT)
Dept: INTERNAL MEDICINE CLINIC | Age: 34
End: 2019-07-02

## 2019-07-02 VITALS
HEIGHT: 66 IN | BODY MASS INDEX: 23.78 KG/M2 | WEIGHT: 148 LBS | OXYGEN SATURATION: 100 % | SYSTOLIC BLOOD PRESSURE: 130 MMHG | HEART RATE: 83 BPM | DIASTOLIC BLOOD PRESSURE: 80 MMHG

## 2019-07-02 DIAGNOSIS — G25.81 RESTLESS LEG SYNDROME: ICD-10-CM

## 2019-07-02 DIAGNOSIS — G47.01 INSOMNIA DUE TO MEDICAL CONDITION: ICD-10-CM

## 2019-07-02 DIAGNOSIS — G56.32 RADIAL NERVE PALSY, LEFT: Primary | ICD-10-CM

## 2019-07-02 DIAGNOSIS — F41.8 ANXIOUS DEPRESSION: ICD-10-CM

## 2019-07-02 PROCEDURE — 99214 OFFICE O/P EST MOD 30 MIN: CPT | Performed by: INTERNAL MEDICINE

## 2019-07-02 RX ORDER — TRAZODONE HYDROCHLORIDE 50 MG/1
50 TABLET ORAL NIGHTLY PRN
Qty: 30 TABLET | Refills: 5 | Status: SHIPPED | OUTPATIENT
Start: 2019-07-02 | End: 2020-02-19

## 2019-07-02 NOTE — PROGRESS NOTES
SUBJECTIVE:  Follow up from 6/18 for depression with anxiety. He was started on venlafaxine 75 mg but stopped after three days due to increased somnolence. Continues to smoke 0.5 ppd, down from 1 ppd. Some anxiety relief with smoking. History of present illness:  He was intended to start on Seroquel. Continues on trazodone 50 mg at night but only taking every other night to avoid dependence. Left arm radial nerve palsy about the same, started August 31. Less ice pica since he started taking ferrous sulfate but also due to tooth pain. MEDICATIONS:  amitriptyline (ELAVIL) 25 MG tablet Take 2 tablets by mouth nightly   traZODone (DESYREL) 50 MG tablet Take 1 tablet by mouth nightly as needed for Sleep   ferrous sulfate 325 (65 Fe) MG tablet Take 1 tablet by mouth daily (with breakfast)         Lab Results   Component Value Date    WBC 12.5 (H) 02/26/2019    HGB 15.1 02/26/2019     02/26/2019    MCV 79.0 (L) 02/26/2019     Lab Results   Component Value Date     08/31/2018    K 4.5 08/31/2018     08/31/2018    CO2 27 08/31/2018    BUN 21 (H) 08/31/2018    CREATININE 0.8 (L) 08/31/2018    GLUCOSE 103 (H) 08/31/2018       OBJECTIVE:    /80 (Site: Left Upper Arm, Position: Sitting, Cuff Size: Medium Adult)   Pulse 83   Ht 5' 6\" (1.676 m)   Wt 148 lb (67.1 kg)   SpO2 100%   BMI 23.89 kg/m²   HEENT:  Oropharynx clear, no lymphadenopathy,   LUNGS:  Clear and without wheezes  HEART:  Regular rhythm, no appreciable murmur  ABD:  Benign, active bowel sounds  EXT:  No edema, pulses palpable  NEURO:  No focal neurologic deficits noted. ASSESSMENT / PLAN:   1. Left radial nerve palsy:  Continue with therapy. 2. Difficulty sleeping due to neuropathy:  START back on amitriptyline (Elavil) 25 mg nightly.   3. Insomnia: Take trazodone 50 mg tablet EVERY night.   4. Restless leg syndrome with mild iron deficiency:  Continue to take ferrous sulfate (iron tablet) 325 mg tablet every other day with 500 mg vitamin C and food for another three months. 5. Smoking:  Continue to cut back on cigarettes until you are not smoking. 6. Anxious depression:  Refer to behavioral health for outpatient.          Follow-up as needed

## 2019-12-07 ENCOUNTER — HOSPITAL ENCOUNTER (EMERGENCY)
Age: 34
Discharge: HOME | End: 2019-12-07
Attending: EMERGENCY MEDICINE
Payer: MEDICAID

## 2019-12-07 VITALS
DIASTOLIC BLOOD PRESSURE: 95 MMHG | TEMPERATURE: 100 F | WEIGHT: 150 LBS | BODY MASS INDEX: 23.54 KG/M2 | OXYGEN SATURATION: 100 % | SYSTOLIC BLOOD PRESSURE: 141 MMHG | HEART RATE: 98 BPM | RESPIRATION RATE: 16 BRPM | HEIGHT: 67 IN

## 2019-12-07 DIAGNOSIS — R10.32 LEFT LOWER QUADRANT ABDOMINAL PAIN: Primary | ICD-10-CM

## 2019-12-07 LAB
BILIRUBIN URINE: NEGATIVE
BLOOD, URINE: NEGATIVE
CLARITY: CLEAR
COLOR: YELLOW
GLUCOSE URINE: NEGATIVE MG/DL
KETONES, URINE: NEGATIVE MG/DL
LEUKOCYTE ESTERASE, URINE: NEGATIVE
MICROSCOPIC EXAMINATION: NORMAL
NITRITE, URINE: NEGATIVE
PH UA: 7.5 (ref 5–8)
PROTEIN UA: NEGATIVE MG/DL
SPECIFIC GRAVITY UA: 1.01 (ref 1–1.03)
URINE REFLEX TO CULTURE: NORMAL
URINE TYPE: NORMAL
UROBILINOGEN, URINE: 0.2 E.U./DL

## 2019-12-07 PROCEDURE — 99283 EMERGENCY DEPT VISIT LOW MDM: CPT

## 2019-12-07 PROCEDURE — 81003 URINALYSIS AUTO W/O SCOPE: CPT

## 2020-02-19 ENCOUNTER — OFFICE VISIT (OUTPATIENT)
Dept: ORTHOPEDIC SURGERY | Age: 35
End: 2020-02-19
Payer: MEDICAID

## 2020-02-19 VITALS
WEIGHT: 149.91 LBS | HEART RATE: 105 BPM | DIASTOLIC BLOOD PRESSURE: 81 MMHG | SYSTOLIC BLOOD PRESSURE: 126 MMHG | BODY MASS INDEX: 23.53 KG/M2 | HEIGHT: 67 IN

## 2020-02-19 PROCEDURE — G8484 FLU IMMUNIZE NO ADMIN: HCPCS | Performed by: PHYSICIAN ASSISTANT

## 2020-02-19 PROCEDURE — G8428 CUR MEDS NOT DOCUMENT: HCPCS | Performed by: PHYSICIAN ASSISTANT

## 2020-02-19 PROCEDURE — 99203 OFFICE O/P NEW LOW 30 MIN: CPT | Performed by: PHYSICIAN ASSISTANT

## 2020-02-19 PROCEDURE — 4004F PT TOBACCO SCREEN RCVD TLK: CPT | Performed by: PHYSICIAN ASSISTANT

## 2020-02-19 PROCEDURE — L3908 WHO COCK-UP NONMOLDE PRE OTS: HCPCS | Performed by: PHYSICIAN ASSISTANT

## 2020-02-19 PROCEDURE — G8420 CALC BMI NORM PARAMETERS: HCPCS | Performed by: PHYSICIAN ASSISTANT

## 2020-02-22 NOTE — PROGRESS NOTES
Date:  2020    Name:  Zenia Webb  Address:  [address] [address]    :  1985      Age:   37 y.o.    SSN:  [id number]      Medical Record Number:  [id number]      Chief Complaint    Hand Pain (RIGHT HAND. STARTED BOTHERING HIM BACK IN [place]. NO MECHANISM OF INJURY. PAIN THROUGH THUMB INTO WRIST. )        Subjective:      Zenia Webb is a 37 y.o. male who presents to the [facility] after [address] for evaluation of right hand pain. Patient states that he has been exhibiting right hand pain for the past 2 months with no known MRI. He notes that the pain is located over the radial aspect of the right thumb and wrist.  He describes the pain as dull and achy with sharp intermittent bouts anytime he utilizes his thumb. Has any previous injury or surgery to the right hand. He noticed that the pain is been getting worse over the past 2 weeks. He is attempted conservative therapy over the past 2 months including: Rest, ice, activity modification and anti-inflammatories. Patient is right-handed. He denies any gross deformity or soft tissue swelling. He denies any numbness, paresthesias or weakness. Pain Assessment  Location of Pain: Hand  Location Modifiers: Right  Severity of Pain: 6  Quality of Pain: Sharp  Duration of Pain: Persistent  Frequency of Pain: Constant  Aggravating Factors: Stretching, Straightening, Bending  Limiting Behavior: Yes  Relieving Factors: Rest  Result of Injury: No  Work-Related Injury: No  Are there other pain locations you wish to document?: No    Patient's medications, allergies, past medical, surgical, social and family histories were reviewed and updated as appropriate.      Objective:     /81   Pulse 105   Ht 5' 7.01\" (1.702 m)   Wt 149 lb 14.6 oz (68 kg)   BMI 23.47 kg/m²     Physical Exam:     General Exam:    General: Zenia Webb is a healthy and well appearing 37y.o. year old male who is sitting comfortably in our hand pain  XR HAND RIGHT (MIN 3 VIEWS)    [employer] Thumb Orthosis Meena-Meneses Brace     MDM:  Patient history, physical exam as well as x-ray imaging were thoroughly reviewed with the patient in the office today. His history and physical exam are consistent with de Quervain's disease. The patient was educated on conservative therapy as detailed the treatment plan below for this condition. He was provided with a wrist brace with a thumb spica in order to keep the patient from moving the wrist or thumb in the planes or motions that cause him pain. The patient already has anti-inflammatory medication at home. He was given the contact information for hand and wrist specialist should his condition not improve with conservative therapy over the next 3 to 4 weeks. Plan:      Plan:  1. As wrist brace with activity throughout the day and while sleeping  2. Activity modification  3. Ice 20 minutes ever 1-2 hours PRN  4. NSAIDs as needed  5. Rest   6. Elevation at least 2 inches above the heart with pillows supporting the joint underneath        Follow up in: 3 to 4 weeks or as needed                [address] Mcfarlane    Physician Assistant - Certified  [employer] and [address] [place] [address]    02/22/20 1:44 PM      This dictation was performed with a verbal recognition program (DRAGON) and it was checked for errors. It is possible that there are still dictated errors within this office note. If so, please bring any errors to my attention for an addendum. All efforts were made to ensure that this office note is accurate.

## 2020-03-03 ENCOUNTER — OFFICE VISIT (OUTPATIENT)
Dept: ORTHOPEDIC SURGERY | Age: 35
End: 2020-03-03
Payer: MEDICAID

## 2020-03-03 VITALS — BODY MASS INDEX: 23.53 KG/M2 | WEIGHT: 149.91 LBS | HEIGHT: 67 IN

## 2020-03-03 PROCEDURE — G8484 FLU IMMUNIZE NO ADMIN: HCPCS | Performed by: ORTHOPAEDIC SURGERY

## 2020-03-03 PROCEDURE — 20550 NJX 1 TENDON SHEATH/LIGAMENT: CPT | Performed by: ORTHOPAEDIC SURGERY

## 2020-03-03 PROCEDURE — G8420 CALC BMI NORM PARAMETERS: HCPCS | Performed by: ORTHOPAEDIC SURGERY

## 2020-03-03 PROCEDURE — 99243 OFF/OP CNSLTJ NEW/EST LOW 30: CPT | Performed by: ORTHOPAEDIC SURGERY

## 2020-03-03 PROCEDURE — G8427 DOCREV CUR MEDS BY ELIG CLIN: HCPCS | Performed by: ORTHOPAEDIC SURGERY

## 2020-03-03 RX ORDER — BETAMETHASONE SODIUM PHOSPHATE AND BETAMETHASONE ACETATE 3; 3 MG/ML; MG/ML
12 INJECTION, SUSPENSION INTRA-ARTICULAR; INTRALESIONAL; INTRAMUSCULAR; SOFT TISSUE ONCE
Status: COMPLETED | OUTPATIENT
Start: 2020-03-03 | End: 2020-03-03

## 2020-03-03 RX ORDER — LIDOCAINE HYDROCHLORIDE 10 MG/ML
20 INJECTION, SOLUTION INFILTRATION; PERINEURAL ONCE
Status: COMPLETED | OUTPATIENT
Start: 2020-03-03 | End: 2020-03-03

## 2020-03-03 RX ADMIN — LIDOCAINE HYDROCHLORIDE 1 ML: 10 INJECTION, SOLUTION INFILTRATION; PERINEURAL at 11:12

## 2020-03-03 RX ADMIN — BETAMETHASONE SODIUM PHOSPHATE AND BETAMETHASONE ACETATE 1 MG: 3; 3 INJECTION, SUSPENSION INTRA-ARTICULAR; INTRALESIONAL; INTRAMUSCULAR; SOFT TISSUE at 11:12

## 2020-03-03 NOTE — PROGRESS NOTES
Types: 1 - 2 Cans of beer per week     Comment: couple times a month    Drug use: Yes     Types: Marijuana    Sexual activity: None   Lifestyle    Physical activity:     Days per week: None     Minutes per session: None    Stress: None   Relationships    Social connections:     Talks on phone: None     Gets together: None     Attends Shinto service: None     Active member of club or organization: None     Attends meetings of clubs or organizations: None     Relationship status: None    Intimate partner violence:     Fear of current or ex partner: None     Emotionally abused: None     Physically abused: None     Forced sexual activity: None   Other Topics Concern    None   Social History Narrative    None     No current outpatient medications on file. No current facility-administered medications for this visit. No Known Allergies    ROS:  ROS neg except for positives in HPI    PHYSICAL EXAMINATION:   Gen/Psych: Examination reveals a pleasant individual in no acute distress. The patient is oriented to time, place and person. The patient's mood and affect are appropriate. Lymph: The lymphatic examination bilaterally reveals all areas to be without enlargement or induration. Skin intact without lymphadenopathy, discoloration, or abnormal temperature. Vascular: There is intact, symmetric circulation in both upper extremities. Musculoskeletal       Cervical spine, shoulders, elbows:  satisfactory comfortable baseline range of motion, strength, and stability       Hand/Wrist and digits:   Satisfactory range of motion bilaterally. There is tenderness over the radial wrist at the                                    right first dorsal extensor compartment and a                                      positive Finkelsteins test which reproduces symptoms. No thumb triggering.   No palpable cyst.  There is swelling over the first dorsal compartment tendon sheath. Neurological: neg provocation testing for carpal tunnel    Radiology:     X-rays reviewed in office:  Views 3  Location right hand  Impression :  No fracture or dislocation. Good alignment. IMPRESSION AND PLAN: Right wrist and thumb de Quervain's tendinitis. I think this is amenable to conservative care as an initial line of treatment. I have recommended a short arm thumb spica brace and injection. Patient was in agreement. The risks and benefits of cortisone injection were discussed thoroughly. Risks discussed included infection, adverse drug reaction, increased pain post-injection, skin de-pigmentation, fatty atrophy, and persistent clinical symptoms despite injection. Use of ethyl chloride spray during injection to decrease injection site pain was discussed. The injection was given after cleansing the skin with alcohol. Right wrist first dorsal compartment tendon sheath was injected with 1 cc of 1% lidocaine without epinephrine and 1 cc of Celestone without difficulty. The patient tolerated the injection well and a Band-aid was placed. We did provide some range of motion and stretching exercises. Patient will follow-up if symptoms do not resolve. We appreciate the patient referral    All questions and concerns were addressed today. Patient is in agreement with the plan. Shital Rueda MD  Hand & Upper Extremity Surgery  [address]  A partner of [facility] ([facility])        Please note that this transcription was created using voice recognition software. Any errors are unintentional and may be due to voice recognition transcription.

## 2020-07-30 ENCOUNTER — HOSPITAL ENCOUNTER (EMERGENCY)
Age: 35
Discharge: HOME | End: 2020-07-30
Payer: OTHER MISCELLANEOUS

## 2020-07-30 ENCOUNTER — APPOINTMENT (OUTPATIENT)
Dept: CT IMAGING | Age: 35
End: 2020-07-30
Payer: OTHER MISCELLANEOUS

## 2020-07-30 ENCOUNTER — APPOINTMENT (OUTPATIENT)
Dept: GENERAL RADIOLOGY | Age: 35
End: 2020-07-30
Payer: OTHER MISCELLANEOUS

## 2020-07-30 VITALS
HEART RATE: 83 BPM | BODY MASS INDEX: 20.36 KG/M2 | OXYGEN SATURATION: 99 % | WEIGHT: 130 LBS | TEMPERATURE: 98.7 F | SYSTOLIC BLOOD PRESSURE: 127 MMHG | DIASTOLIC BLOOD PRESSURE: 85 MMHG | RESPIRATION RATE: 14 BRPM

## 2020-07-30 PROCEDURE — 93005 ELECTROCARDIOGRAM TRACING: CPT

## 2020-07-30 PROCEDURE — 70450 CT HEAD/BRAIN W/O DYE: CPT

## 2020-07-30 PROCEDURE — 99284 EMERGENCY DEPT VISIT MOD MDM: CPT

## 2020-07-30 PROCEDURE — 71101 X-RAY EXAM UNILAT RIBS/CHEST: CPT

## 2020-07-30 PROCEDURE — 72125 CT NECK SPINE W/O DYE: CPT

## 2020-07-30 RX ORDER — NAPROXEN 500 MG/1
500 TABLET ORAL 2 TIMES DAILY WITH MEALS
Qty: 14 TABLET | Refills: 0 | Status: SHIPPED | OUTPATIENT
Start: 2020-07-30

## 2020-07-30 ASSESSMENT — PAIN SCALES - GENERAL: PAINLEVEL_OUTOF10: 9

## 2020-07-30 NOTE — ED NOTES
Patient is neg on the ED triage fall screening but this RN determines pt is fall risk d/t possible intoxication. Patient placed in fall precautions which includes:  yellow fall risk bracelet on wrist, yellow socks on feet, \"Be Safe\" sign placed on patient's door, and bed alarm placed under patient/alarm turned on. Patient instructed on importance of not getting out of bed or ambulating without assistance for safety.           Cande Adams RN  07/30/20 0244

## 2020-07-30 NOTE — ED NOTES
Bed: 14  Expected date:   Expected time:   Means of arrival:   Comments:  Lu Lawrence RN  07/30/20 5469

## 2020-07-31 LAB
EKG ATRIAL RATE: 93 BPM
EKG DIAGNOSIS: NORMAL
EKG P AXIS: 58 DEGREES
EKG P-R INTERVAL: 130 MS
EKG Q-T INTERVAL: 346 MS
EKG QRS DURATION: 100 MS
EKG QTC CALCULATION (BAZETT): 430 MS
EKG R AXIS: 73 DEGREES
EKG T AXIS: 48 DEGREES
EKG VENTRICULAR RATE: 93 BPM

## 2020-07-31 PROCEDURE — 93010 ELECTROCARDIOGRAM REPORT: CPT | Performed by: INTERNAL MEDICINE

## 2020-07-31 NOTE — ED PROVIDER NOTES
I did not have face-to-face interaction with this patient. I did not discuss the case with the advanced practice provider. I was available for consultation on the patient if deemed necessary by advanced practice provider. EKG  The Ekg interpreted by me shows  normal sinus rhythm with a rate of 93  Axis is   Normal  QTc is  normal  Intervals and Durations are unremarkable.       ST Segments: no acute change  No significant change from prior EKG dated 31 aug 2018           Ze Jaime MD  07/30/20 6070
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
## 2020-08-24 ENCOUNTER — OFFICE VISIT (OUTPATIENT)
Dept: PRIMARY CARE CLINIC | Age: 35
End: 2020-08-24
Payer: MEDICAID

## 2020-08-24 PROCEDURE — 99211 OFF/OP EST MAY X REQ PHY/QHP: CPT | Performed by: NURSE PRACTITIONER

## 2020-08-24 PROCEDURE — G8420 CALC BMI NORM PARAMETERS: HCPCS | Performed by: NURSE PRACTITIONER

## 2020-08-24 PROCEDURE — G8428 CUR MEDS NOT DOCUMENT: HCPCS | Performed by: NURSE PRACTITIONER

## 2020-08-24 NOTE — PROGRESS NOTES
Maritza Adjutant received a viral test for COVID-19. They were educated on isolation and quarantine as appropriate. For any symptoms, they were directed to seek care from their PCP, given contact information to establish with a doctor, directed to an urgent care or the emergency room.

## 2020-08-24 NOTE — PATIENT INSTRUCTIONS
Advance Care Planning  People with COVID-19 may have no symptoms, mild symptoms, such as fever, cough, and shortness of breath or they may have more severe illness, developing severe and fatal pneumonia. As a result, Advance Care Planning with attention to naming a health care decision maker (someone you trust to make healthcare decisions for you if you could not speak for yourself) and sharing other health care preferences is important BEFORE a possible health crisis. Please contact your Primary Care Provider to discuss Advance Care Planning. Preventing the Spread of Coronavirus Disease 2019 in Homes and Residential Communities  For the most recent information go to [email or web address]    Prevention steps for People with confirmed or suspected COVID-19 (including persons under investigation) who do not need to be hospitalized  and   People with confirmed COVID-19 who were hospitalized and determined to be medically stable to go home    Your healthcare provider and public health staff will evaluate whether you can be cared for at home. If it is determined that you do not need to be hospitalized and can be isolated at home, you will be monitored by staff from your local or state health department. You should follow the prevention steps below until a healthcare provider or local or state health department says you can return to your normal activities. Stay home except to get medical care  People who are mildly ill with COVID-19 are able to isolate at home during their illness. You should restrict activities outside your home, except for getting medical care. Do not go to work, school, or public areas. Avoid using public transportation, ride-sharing, or taxis. Separate yourself from other people and animals in your home  People: As much as possible, you should stay in a specific room and away from other people in your home.  Also, you should use a separate before eating or preparing food. If soap and water are not readily available, use an alcohol-based hand  with at least 60% alcohol, covering all surfaces of your hands and rubbing them together until they feel dry. Soap and water are the best option if hands are visibly dirty. Avoid touching your eyes, nose, and mouth with unwashed hands. Avoid sharing personal household items  You should not share dishes, drinking glasses, cups, eating utensils, towels, or bedding with other people or pets in your home. After using these items, they should be washed thoroughly with soap and water. Clean all high-touch surfaces everyday  High touch surfaces include counters, tabletops, doorknobs, bathroom fixtures, toilets, phones, keyboards, tablets, and bedside tables. Also, clean any surfaces that may have blood, stool, or body fluids on them. Use a household cleaning spray or wipe, according to the label instructions. Labels contain instructions for safe and effective use of the cleaning product including precautions you should take when applying the product, such as wearing gloves and making sure you have good ventilation during use of the product. Monitor your symptoms  Seek prompt medical attention if your illness is worsening (e.g., difficulty breathing). Before seeking care, call your healthcare provider and tell them that you have, or are being evaluated for, COVID-19. Put on a facemask before you enter the facility. These steps will help the healthcare providers office to keep other people in the office or waiting room from getting infected or exposed. Ask your healthcare provider to call the local or state health department. Persons who are placed under active monitoring or facilitated self-monitoring should follow instructions provided by their local health department or occupational health professionals, as appropriate. When working with your local health department check their available hours.   If you have a medical emergency and need to call 911, notify the dispatch personnel that you have, or are being evaluated for COVID-19. If possible, put on a facemask before emergency medical services arrive. Discontinuing home isolation  Patients with confirmed COVID-19 should remain under home isolation precautions until the risk of secondary transmission to others is thought to be low. The decision to discontinue home isolation precautions should be made on a case-by-case basis, in consultation with healthcare providers and state and local health departments.

## 2020-08-25 LAB — SARS-COV-2, NAA: NOT DETECTED

## 2021-10-20 NOTE — ED NOTES
Pt able to pull his shift off and on since arriving  using both arms. Left arm bends at the elbow without difficulty. Pt walked to bathroom without incident. No staggering or decreased movement in bilat lower extremities noted.       Jagjit Mooney RN  08/31/18 6573 [As Noted in HPI] : as noted in HPI [Negative] : Heme/Lymph